# Patient Record
Sex: FEMALE | Race: WHITE | NOT HISPANIC OR LATINO | Employment: OTHER | URBAN - METROPOLITAN AREA
[De-identification: names, ages, dates, MRNs, and addresses within clinical notes are randomized per-mention and may not be internally consistent; named-entity substitution may affect disease eponyms.]

---

## 2017-04-10 ENCOUNTER — TRANSCRIBE ORDERS (OUTPATIENT)
Dept: ADMINISTRATIVE | Facility: HOSPITAL | Age: 72
End: 2017-04-10

## 2017-04-10 DIAGNOSIS — N64.4 PAIN IN BREAST: Primary | ICD-10-CM

## 2017-04-11 ENCOUNTER — TRANSCRIBE ORDERS (OUTPATIENT)
Dept: MAMMOGRAPHY | Facility: HOSPITAL | Age: 72
End: 2017-04-11

## 2017-04-11 DIAGNOSIS — N64.4 BREAST PAIN, LEFT: Primary | ICD-10-CM

## 2017-04-20 ENCOUNTER — APPOINTMENT (OUTPATIENT)
Dept: MAMMOGRAPHY | Facility: HOSPITAL | Age: 72
End: 2017-04-20

## 2017-04-21 ENCOUNTER — APPOINTMENT (OUTPATIENT)
Dept: OTHER | Facility: HOSPITAL | Age: 72
End: 2017-04-21

## 2017-04-21 ENCOUNTER — HOSPITAL ENCOUNTER (OUTPATIENT)
Dept: MAMMOGRAPHY | Facility: HOSPITAL | Age: 72
Discharge: HOME | End: 2017-04-21
Admitting: INTERNAL MEDICINE

## 2017-04-21 DIAGNOSIS — N64.4 BREAST PAIN, LEFT: ICD-10-CM

## 2017-04-21 PROCEDURE — G0279 TOMOSYNTHESIS, MAMMO: HCPCS | Performed by: RADIOLOGY

## 2017-04-21 PROCEDURE — G0279 TOMOSYNTHESIS, MAMMO: HCPCS

## 2017-04-21 PROCEDURE — G0206 DX MAMMO INCL CAD UNI: HCPCS

## 2017-04-21 PROCEDURE — G0206 DX MAMMO INCL CAD UNI: HCPCS | Performed by: RADIOLOGY

## 2020-11-16 ENCOUNTER — OFFICE VISIT (OUTPATIENT)
Dept: ORTHOPEDIC SURGERY | Facility: CLINIC | Age: 75
End: 2020-11-16

## 2020-11-16 VITALS — WEIGHT: 139.99 LBS | HEIGHT: 64 IN | BODY MASS INDEX: 23.9 KG/M2

## 2020-11-16 DIAGNOSIS — S82.892A CLOSED FRACTURE OF LEFT ANKLE, INITIAL ENCOUNTER: Primary | ICD-10-CM

## 2020-11-16 PROCEDURE — 99204 OFFICE O/P NEW MOD 45 MIN: CPT | Performed by: ORTHOPAEDIC SURGERY

## 2020-11-16 PROCEDURE — 27786 TREATMENT OF ANKLE FRACTURE: CPT | Performed by: ORTHOPAEDIC SURGERY

## 2020-11-16 NOTE — PROGRESS NOTES
[facility]    Subjective     Chief Complaint   Patient presents with   • Left Ankle - Pain, Edema        HPI      Christina Guillen is a 75 y.o. female who presents with new problem of: left ankle fracture.  Onset: mechanical fall. The issue has been ongoing for 3 day(s). Pain is a 1/10 on the pain scale. Pain is described as dull. Associated symptoms include pain and swelling. The pain is worse with walking, standing, climbing stairs and any movement of the joint; resting and pain medication and/or NSAID improve the pain. Previous treatments have included: bracing.    I have reviewed the following portions of the patient's history:History of Present Illness and review of systems.    She fell and fractured her ankle on the 13th.  She is in a boot.  Past Medical History:   Diagnosis Date   • Breast cancer (CMS/HCC) 1975    right    • Cancer (CMS/HCC)    • Disease of thyroid gland    • Hypertension       Past Surgical History:   Procedure Laterality Date   • BREAST BIOPSY Right 1975   • MASTECTOMY Right 1975      Family History   Problem Relation Age of Onset   • Heart valve disorder Mother    • Kidney failure Mother    • Breast cancer Neg Hx    • Ovarian cancer Neg Hx      Social History     Socioeconomic History   • Marital status:      Spouse name: Not on file   • Number of children: Not on file   • Years of education: Not on file   • Highest education level: Not on file   Tobacco Use   • Smoking status: Never Smoker   • Smokeless tobacco: Never Used   Substance and Sexual Activity   • Alcohol use: Never     Frequency: Never   • Drug use: Never   • Sexual activity: Defer      Current Outpatient Medications on File Prior to Visit   Medication Sig Dispense Refill   • amLODIPine (NORVASC) 5 MG tablet Take 5 mg by mouth Daily.     • aspirin 81 MG EC tablet Take 81 mg by mouth Daily.     • atenolol (TENORMIN) 25 MG tablet Take 25 mg by mouth Daily.     • levothyroxine (SYNTHROID,  "LEVOTHROID) 50 MCG tablet Take 50 mcg by mouth Daily.     • pravastatin (PRAVACHOL) 20 MG tablet Take 20 mg by mouth Daily.       No current facility-administered medications on file prior to visit.       Allergies   Allergen Reactions   • Levofloxacin Hives   • Erythromycin Rash   • Penicillins Rash   • Septra [Sulfamethoxazole-Trimethoprim] Rash        The following portions of the patient's history were reviewed and updated as appropriate: allergies, current medications, past family history, past medical history, past social history, past surgical history and problem list.    Review of Systems   Constitutional: Negative.    HENT: Negative.    Eyes: Negative.    Respiratory: Negative.    Cardiovascular: Negative.    Gastrointestinal: Negative.    Endocrine: Negative.    Genitourinary: Negative.    Musculoskeletal: Positive for arthralgias and joint swelling.   Skin: Negative.    Allergic/Immunologic: Negative.    Neurological: Negative.    Hematological: Negative.    Psychiatric/Behavioral: Negative.         Objective      Physical Exam  Ht 162.6 cm (64.02\")   Wt 63.5 kg (139 lb 15.9 oz)   BMI 24.02 kg/m²     Body mass index is 24.02 kg/m².    GENERAL APPEARANCE: awake, alert & oriented x 3, in no acute distress and well developed, well nourished  PSYCH: normal mood and affect  LUNGS:  breathing nonlabored, no wheezing  EYES: sclera anicteric, pupils equal  CARDIOVASCULAR: palpable pulses. Capillary refill less than 2 seconds  INTEGUMENTARY: skin intact, no clubbing, cyanosis  NEUROLOGIC:  Normal Sensation and reflexes       Ortho Exam  Peripheral Vascular   Left lower extremity    No cyanotic nail beds    Pink nail beds and rapid capillary refill   Palpation    Pulse - Bilaterally normal    Musculoskeletal   Left ankle   Inspection and Palpation:    Tenderness - exquisitely tender and about the distal fibula    Swelling - hematoma    Effusion - none    Muscle tone - no atrophy    Pulses - " +2   Deformities/Malalignments/Discrepancies    Normal bony contour    There is a documented closed fracture : location - left - distal end of fibula        Imaging/Studies  Imaging Results (Last 7 Days)     ** No results found for the last 168 hours. **        I viewed x-rays from November 14 which show nondisplaced distal fibula fracture  Assessment/Plan        ICD-10-CM ICD-9-CM   1. Closed fracture of left ankle, initial encounter  S82.892A 824.8     The plan will be continue the boot.  Follow-up in 2 weeks to make sure the fracture does not displace.  Weight-bear as tolerated in the boot.  Medical Decision Making  Management Options : over-the-counter medicine and close treatment of fracture or dislocation  Data/Risk: radiology tests and independent visualization of imaging, lab tests, or EMG/NCV    Mike Morales MD  11/16/20  11:23 EST         EMR Dragon/Transcription disclaimer:  Much of this encounter note is an electronic transcription of spoken language to printed text. Electronic transcription of spoken language may permit erroneous, or at times, nonsensical words or phrases to be inadvertently transcribed. Although I have reviewed the note for such errors, some may still exist.

## 2020-11-30 ENCOUNTER — OFFICE VISIT (OUTPATIENT)
Dept: ORTHOPEDIC SURGERY | Facility: CLINIC | Age: 75
End: 2020-11-30

## 2020-11-30 VITALS — BODY MASS INDEX: 23.9 KG/M2 | OXYGEN SATURATION: 98 % | WEIGHT: 139.99 LBS | HEIGHT: 64 IN | HEART RATE: 67 BPM

## 2020-11-30 DIAGNOSIS — S82.892A CLOSED FRACTURE OF LEFT ANKLE, INITIAL ENCOUNTER: Primary | ICD-10-CM

## 2020-11-30 PROCEDURE — 27786 TREATMENT OF ANKLE FRACTURE: CPT | Performed by: ORTHOPAEDIC SURGERY

## 2020-11-30 NOTE — PROGRESS NOTES
NEW PATIENT    Patient: Christina Guillen  : 1945    Primary Care Provider: Annie Valdez MD    Requesting Provider: As above    Pain of the Left Ankle (2 week f/u from Dr. Morales)      History    Chief Complaint: Left ankle injury    History of Present Illness: This is a very pleasant 75-year-old woman who I have seen in the distant past.  She has a nondisplaced transverse left distal fibula fracture, [place] A.  She is here for another opinion.  She fell on 2020, she has been in a short boot.  She was seen at urgent treatment and then saw Dr. Morales.  She has been walking with a walker, mostly because her right knee hurts with the uneven nature of the boot.  She has more pain with activity, better with rest, she rates it as 1 out of 10 right now.    Current Outpatient Medications on File Prior to Visit   Medication Sig Dispense Refill   • amLODIPine (NORVASC) 5 MG tablet Take 5 mg by mouth Daily.     • aspirin 81 MG EC tablet Take 81 mg by mouth Daily.     • atenolol (TENORMIN) 50 MG tablet Take 50 mg by mouth Daily.     • levothyroxine (SYNTHROID, LEVOTHROID) 50 MCG tablet Take 50 mcg by mouth Daily.     • pravastatin (PRAVACHOL) 20 MG tablet Take 20 mg by mouth Daily.       No current facility-administered medications on file prior to visit.       Allergies   Allergen Reactions   • Levofloxacin Hives   • Erythromycin Rash   • Penicillins Rash   • Septra [Sulfamethoxazole-Trimethoprim] Rash      Past Medical History:   Diagnosis Date   • Breast cancer (CMS/HCC)     right    • Cancer (CMS/HCC)    • Disease of thyroid gland    • Hypertension      Past Surgical History:   Procedure Laterality Date   • BREAST BIOPSY Right    • MASTECTOMY Right      Family History   Problem Relation Age of Onset   • Heart valve disorder Mother    • Kidney failure Mother    • Breast cancer Neg Hx    • Ovarian cancer Neg Hx       Social History     Socioeconomic History   • Marital status:  "     Spouse name: Not on file   • Number of children: Not on file   • Years of education: Not on file   • Highest education level: Not on file   Tobacco Use   • Smoking status: Never Smoker   • Smokeless tobacco: Never Used   Substance and Sexual Activity   • Alcohol use: Never     Frequency: Never   • Drug use: Never   • Sexual activity: Defer        Review of Systems   Constitutional: Negative.    HENT: Negative.    Eyes: Negative.    Respiratory: Negative.    Cardiovascular: Negative.    Gastrointestinal: Negative.    Endocrine: Negative.    Genitourinary: Negative.    Musculoskeletal: Positive for arthralgias.   Skin: Negative.    Allergic/Immunologic: Negative.    Neurological: Negative.    Hematological: Negative.    Psychiatric/Behavioral: Negative.        The following portions of the patient's history were reviewed and updated as appropriate: allergies, current medications, past family history, past medical history, past social history, past surgical history and problem list.    Physical Exam:   Pulse 67   Ht 162.6 cm (64.02\")   Wt 63.5 kg (139 lb 15.9 oz)   SpO2 98%   BMI 24.02 kg/m²   GENERAL: Body habitus: normal weight for height    Lower extremity edema: Right: none; Left: trace    Varicose veins:  Right: mild; Left: mild    Gait: using walker     Mental Status:  awake and alert; oriented to person, place, and time    Voice:  clear  SKIN:  Lower extremity: Normal    Hair Growth(lower extremity):  Right:normal; Left:  normal  NAILS: Toenails: normal  HEENT: Head: Normocephalic, atraumatic,  without obvious abnormality.  eye: normal external eye, no icterus  ears:normal external ears  PULM:  Repiratory effort normal  CV:  Dorsalis Pedis:  Right: 2+; Left:2+    Posterior Tibial: Right:2+; Left:2+    Capillary Refill:  Brisk  MSK:  Hand:moderate arthritis      Tibia:  Right:  non tender; Left:  non tender      Ankle:  Right: non tender; Left:  Tender over the distal fibula, nontender over the " syndesmosis, nontender medially, nontender in the heel, moderate swelling around the ankle, no ecchymosis      Foot:  Right:  non tender; Left:  No tenderness in the midfoot and forefoot      NEURO: Heel Walking:  Right:  unable to test; Left:  unable to test    Toe Walking:  Right:  unable to test; Left:  unable to test     [place] 5.07 monofilament test: normal    Lower extremity sensation: intact     Reflexes:  Biceps:  Right:  not tested; Left:  not tested           Quads:  Right:  not tested; Left:  not tested           Ankle:  Right:  not tested; Left:  not tested      Calf Atrophy:none    Motor Function: All motors fire, cannot adequately evaluate strength due to pain on the left, Achilles is intact         Medical Decision Making    Data Review:   ordered and reviewed x-rays today, reviewed radiology images and reviewed radiology results    Assessment and Plan/ Diagnosis/Treatment options:   1. Closed fracture of left ankle, initial encounter  Nondisplaced distal fibular fracture, below the level of the plafond, [place]cel[place] JON.  We talked about the treatment options.  I think a boot is acceptable but it needs to be a tall boot.  The short boot does not adequately immobilize this fracture.  I recommend a tall boot and she was placed in 1 today.  She may walk and do activities as she is comfortable.  I would also recommend an even up for the other side.  I will see her again in 4 to 6 weeks with standing 3 views of the left ankle, to probably allow her to wean out of the boot and start PT  - XR Ankle 3+ View Left            Radiology Ordered []  Radiology Reports Reviewed []      Radiology Images Reviewed []   Labs Reviewed []    Labs Ordered []   PCP Records Reviewed []    Provider Records Reviewed []    ER Records Reviewed []    Hospital Records Reviewed []    History Obtained From Family []    Phone conversation with Provider []    Records Requested []        Andie Hoffman MD

## 2021-01-06 ENCOUNTER — OFFICE VISIT (OUTPATIENT)
Dept: ORTHOPEDIC SURGERY | Facility: CLINIC | Age: 76
End: 2021-01-06

## 2021-01-06 VITALS — BODY MASS INDEX: 23.66 KG/M2 | HEART RATE: 90 BPM | OXYGEN SATURATION: 99 % | WEIGHT: 138.6 LBS | HEIGHT: 64 IN

## 2021-01-06 DIAGNOSIS — M17.11 ARTHRITIS OF KNEE, RIGHT: ICD-10-CM

## 2021-01-06 DIAGNOSIS — S82.892D CLOSED FRACTURE OF LEFT ANKLE WITH ROUTINE HEALING, SUBSEQUENT ENCOUNTER: Primary | ICD-10-CM

## 2021-01-06 PROCEDURE — 99213 OFFICE O/P EST LOW 20 MIN: CPT | Performed by: ORTHOPAEDIC SURGERY

## 2021-01-06 PROCEDURE — 20610 DRAIN/INJ JOINT/BURSA W/O US: CPT | Performed by: PHYSICIAN ASSISTANT

## 2021-01-06 RX ORDER — TRIAMCINOLONE ACETONIDE 40 MG/ML
40 INJECTION, SUSPENSION INTRA-ARTICULAR; INTRAMUSCULAR
Status: COMPLETED | OUTPATIENT
Start: 2021-01-06 | End: 2021-01-06

## 2021-01-06 RX ORDER — LIDOCAINE HYDROCHLORIDE 10 MG/ML
4 INJECTION, SOLUTION EPIDURAL; INFILTRATION; INTRACAUDAL; PERINEURAL
Status: COMPLETED | OUTPATIENT
Start: 2021-01-06 | End: 2021-01-06

## 2021-01-06 RX ADMIN — TRIAMCINOLONE ACETONIDE 40 MG: 40 INJECTION, SUSPENSION INTRA-ARTICULAR; INTRAMUSCULAR at 15:27

## 2021-01-06 RX ADMIN — LIDOCAINE HYDROCHLORIDE 4 ML: 10 INJECTION, SOLUTION EPIDURAL; INFILTRATION; INTRACAUDAL; PERINEURAL at 15:27

## 2021-01-06 NOTE — PROGRESS NOTES
Procedure   Large Joint Arthrocentesis: R knee  Date/Time: 1/6/2021 3:27 PM  Consent given by: patient  Site marked: site marked  Timeout: Immediately prior to procedure a time out was called to verify the correct patient, procedure, equipment, support staff and site/side marked as required   Supporting Documentation  Indications: pain   Procedure Details  Location: knee - R knee  Preparation: Patient was prepped and draped in the usual sterile fashion  Needle size: 22 G  Approach: anterolateral  Medications administered: 4 mL lidocaine PF 1% 1 %; 40 mg triamcinolone acetonide 40 MG/ML  Patient tolerance: patient tolerated the procedure well with no immediate complications

## 2021-01-06 NOTE — PROGRESS NOTES
"ESTABLISHED PATIENT    Patient: Christina Guillen  : 1945    Primary Care Provider: Annie Valdez MD    Requesting Provider: As above    Follow-up (5 weeks follow up for Closed fracture of left ankle)      History    Chief Complaint: Left ankle injury    History of Present Illness: She returns for follow-up of her left distal fibula fracture, she reports the pain is even less.  However her right knee \"is killing me\" it hurts at night.  She has an x-ray in the system from November shows chondrocalcinosis and knee arthritis.    Current Outpatient Medications on File Prior to Visit   Medication Sig Dispense Refill   • amLODIPine (NORVASC) 5 MG tablet Take 5 mg by mouth Daily.     • aspirin 81 MG EC tablet Take 81 mg by mouth Daily.     • atenolol (TENORMIN) 50 MG tablet Take 50 mg by mouth Daily.     • levothyroxine (SYNTHROID, LEVOTHROID) 50 MCG tablet Take 50 mcg by mouth Daily.     • pravastatin (PRAVACHOL) 20 MG tablet Take 20 mg by mouth Daily.       No current facility-administered medications on file prior to visit.       Allergies   Allergen Reactions   • Levofloxacin Hives   • Erythromycin Rash   • Penicillins Rash   • Septra [Sulfamethoxazole-Trimethoprim] Rash      Past Medical History:   Diagnosis Date   • Breast cancer (CMS/HCC)     right    • Cancer (CMS/HCC)    • Disease of thyroid gland    • Hypertension      Past Surgical History:   Procedure Laterality Date   • BREAST BIOPSY Right    • MASTECTOMY Right      Family History   Problem Relation Age of Onset   • Heart valve disorder Mother    • Kidney failure Mother    • Breast cancer Neg Hx    • Ovarian cancer Neg Hx       Social History     Socioeconomic History   • Marital status:      Spouse name: Not on file   • Number of children: Not on file   • Years of education: Not on file   • Highest education level: Not on file   Tobacco Use   • Smoking status: Never Smoker   • Smokeless tobacco: Never Used   Substance and Sexual " "Activity   • Alcohol use: Never     Frequency: Never   • Drug use: Never   • Sexual activity: Defer        Review of Systems   Constitutional: Negative.    HENT: Negative.    Eyes: Negative.    Respiratory: Negative.    Cardiovascular: Negative.    Gastrointestinal: Negative.    Endocrine: Negative.    Genitourinary: Negative.    Musculoskeletal: Positive for arthralgias.   Skin: Negative.    Allergic/Immunologic: Negative.    Neurological: Negative.    Hematological: Negative.    Psychiatric/Behavioral: Negative.        The following portions of the patient's history were reviewed and updated as appropriate: allergies, current medications, past family history, past medical history, past social history, past surgical history and problem list.    Physical Exam:   Pulse 90   Ht 162.6 cm (64.02\")   Wt 62.9 kg (138 lb 9.6 oz)   SpO2 99%   BMI 23.78 kg/m²   GENERAL: Body habitus: normal weight for height    Lower extremity edema: Left: trace; Right: trace    Gait: antalgic on the right     Mental Status:  awake and alert; oriented to person, place, and time  MSK:  Tibia:  Right:  Right knee is tender to palpation along medial joint line; Left:  non tender        Ankle:  Right: non tender; Left:  No tenderness over the fracture, range of motion almost full        Foot:  Right:  non tender; Left:  non tender    NEURO Sensation:  intact    Medical Decision Making    Data Review:   ordered and reviewed x-rays today    Assessment/Plan/Diagnosis/Treatment Options:   1. Closed fracture of left ankle with routine healing, subsequent encounter  The distal fibula fracture is healing well.  She may wean out of the boot and start PT.  I think PT will help her regain balance and proprioception.  -I will see her again in 8 to 12 weeks with 2 views of the ankle    2. Arthritis of knee, right  I think the arthritis has been aggravated by the abnormal gait walking in the boot, I think an injection is reasonable, she agrees.  Ms. " Davon did the injection.    Using sterile technique, the right knee was sterily prepped with Hibiclens.  Following a time out, the right knee was injected with 40 mg Kenalog and 4cc of lidocaine.  Patient tolerated the procedure well.  No complications.     Andie Hoffman MD

## 2021-01-19 ENCOUNTER — HOSPITAL ENCOUNTER (OUTPATIENT)
Dept: PHYSICAL THERAPY | Facility: HOSPITAL | Age: 76
Setting detail: THERAPIES SERIES
Discharge: HOME | End: 2021-01-19

## 2021-01-19 DIAGNOSIS — S82.892D CLOSED FRACTURE OF LEFT ANKLE WITH ROUTINE HEALING, SUBSEQUENT ENCOUNTER: Primary | ICD-10-CM

## 2021-01-19 PROCEDURE — 97161 PT EVAL LOW COMPLEX 20 MIN: CPT

## 2021-01-19 NOTE — THERAPY EVALUATION
Outpatient Physical Therapy Ortho Initial Evaluation  [facility]     Patient Name: Christina Guillen  : 1945  MRN: [id number]  Today's Date: 2021      Visit Date: 2021    There is no problem list on file for this patient.       Past Medical History:   Diagnosis Date   • Breast cancer (CMS/HCC)     right    • Cancer (CMS/HCC)    • Disease of thyroid gland    • Hypertension         Past Surgical History:   Procedure Laterality Date   • BREAST BIOPSY Right    • MASTECTOMY Right        Visit Dx:     ICD-10-CM ICD-9-CM   1. Closed fracture of left ankle with routine healing, subsequent encounter  S82.892D V54.19         Patient History     Row Name 21 0945             History    Chief Complaint  Difficulty Walking;Balance Problems  -LF      Date Current Problem(s) Began  20  -LF      Brief Description of Current Complaint  Patient presents s/p left distal fibula fracture.  Injury occured when she missed a step and slipped going downstairs.  She was in a walking boot for 7 weeks, and has now transitioned to regular shoes 2 weeks ago.  Was having right knee pain aggravated by being in the boot.  She had a cortisone injection recently which helped some.  Knee pain comes and goes.  Has never had much ankle pain.  -LF      Occupation/sports/leisure activities  some walking  -LF         Pain     Pain Location  Ankle  -LF      Pain at Present  0  -LF         Fall Risk Assessment    Any falls in the past year:  Yes  -LF      Number of falls reported in the last 12 months  1  -LF         Daily Activities    Primary Language  English  -LF      Are you able to read  Yes  -LF      Are you able to write  Yes  -LF      Pt Participated in POC and Goals  Yes  -LF        User Key  (r) = Recorded By, (t) = Taken By, (c) = Cosigned By    Initials Name Provider Type    LF Eli Youssef PT Physical Therapist          PT Ortho     Row Name 21 1000       Posture/Observations     "Posture/Observations Comments  Patient ambulates independently w/o A.D. in athletic shoes.   Decreased WB onto RLE.  No signifcant edema at ankle  -LF       General ROM    RT Lower Ext  Rt Ankle Dorsiflexion;Rt Ankle Plantarflexion;Rt Ankle Inversion;Rt Ankle Eversion  -LF    LT Lower Ext  Lt Ankle Dorsiflexion;Lt Ankle Plantarflexion;Lt Ankle Inversion;Lt Ankle Eversion  -LF       Right Lower Ext    Rt Ankle Dorsiflexion AROM  12  -LF    Rt Ankle Plantarflexion AROM  40  -LF    Rt Ankle Inversion AROM  35  -LF    Rt Ankle Eversion AROM  18  -LF       Left Lower Ext    Lt Ankle Dorsiflexion AROM  10  -LF    Lt Ankle Inversion AROM  35  -LF    Lt Ankle Eversion AROM  8  -LF       MMT (Manual Muscle Testing)    General MMT Comments  Able to complete DL heel raise x10.  LE strength 4+/5 HS curl and left foot eversion, otherwise 5/5  -LF       Sensation    Sensation WNL?  WNL  -LF       Balance Skills Training    SLS  R: 4s, L: 1s  -LF    Rhomberg  WNL  -LF    Sharpened Rhomberg  R:  8s, L 7s  -LF    Balance Comments  balance WNL's eyes closed, toe taps onto 6\" step, static look behind.   Slow with 360 turn  -LF      User Key  (r) = Recorded By, (t) = Taken By, (c) = Cosigned By    Initials Name Provider Type    LF Eli Youssef, PT Physical Therapist        Therapy Education  Education Details: HEP issued for supine and sidelying SLR, bridge, ankle eversion with red band, standing 3-way kicks with or without red band, SLS, tandem stance, standing heel and toe raises  Given: HEP  Program: New  How Provided: Verbal, Demonstration, Written  Provided to: Patient  Level of Understanding: Teach back education performed, Verbalized, Demonstrated     PT OP Goals     Row Name 01/19/21 1000          PT Short Term Goals    STG Date to Achieve  01/26/21  -LF     STG 1  Patient independent with HEP for improved strength, ROM, and balance  -LF     STG 1 Progress  New  -LF        Long Term Goals    LTG Date to Achieve  02/16/21  " -LF     LTG 1  Patient able to maintain SLS at least 8s  -LF     LTG 1 Progress  New  -LF     LTG 2  Patient able to maintain sharpened Rhomberg stance at least 20s to demonstrate improved balance.  -LF     LTG 2 Progress  New  -LF     LTG 3  Patient to report ability to walk 1mile without difficulty  -LF     LTG 3 Progress  New  -LF        Time Calculation    PT Goal Re-Cert Due Date  01/28/21  -LF       User Key  (r) = Recorded By, (t) = Taken By, (c) = Cosigned By    Initials Name Provider Type    LF Eli Youssef, PT Physical Therapist          PT Assessment/Plan     Row Name 01/19/21 1000          PT Assessment    Functional Limitations  Impaired gait  -LF     Impairments  Balance;Gait;Muscle strength;Range of motion  -LF     Assessment Comments  Patient presents s/p left ankle fracture.  She has weaned herself from her boot and has been walking in regular shoes for ~2 weeks.   She has mild strength and ROM deficits (eversion), along with impaired balance.  Will benefit from continued treatment to maximize functional mobility.  Will focus on high-level balance activites next visit, and progress HEP.   -LF     Please refer to paper survey for additional self-reported information  Yes  -LF     Rehab Potential  Good  -LF     Patient/caregiver participated in establishment of treatment plan and goals  Yes  -LF     Patient would benefit from skilled therapy intervention  Yes  -LF        PT Plan    PT Frequency  1x/week  -LF     Predicted Duration of Therapy Intervention (PT)  2-3 visits  -LF     Planned CPT's?  PT EVAL LOW COMPLEXITY: 74347;PT THER PROC EA 15 MIN: 62541;PT THER ACT EA 15 MIN: 21833;PT MANUAL THERAPY EA 15 MIN: 32336;PT NEUROMUSC RE-EDUCATION EA 15 MIN: 94427;PT GAIT TRAINING EA 15 MIN: 13990;PT SELF CARE/HOME MGMT/TRAIN EA 15: 72675  -LF     PT Plan Comments  follow-up next week.  Incorporate balance and proprioception exercises next visit  -LF       User Key  (r) = Recorded By, (t) = Taken By,  (c) = Cosigned By    Initials Name Provider Type     Eli Youssef PT Physical Therapist                 Outcome Measure Options: Lower Extremity Functional Scale (LEFS)  Lower Extremity Functional Index  Any of your usual work, housework or school activities: No difficulty  Your usual hobbies, recreational or sporting activities: No difficulty  Getting into or out of the bath: No difficulty  Walking between rooms: No difficulty  Putting on your shoes or socks: No difficulty  Squatting: No difficulty  Lifting an object, like a bag of groceries from the floor: No difficulty  Performing light activities around your home: No difficulty  Performing heavy activities around your home: No difficulty  Getting into or out of a car: No difficulty  Walking 2 blocks: No difficulty  Walking a mile: Quite a bit of difficulty  Going up or down 10 stairs (about 1 flight of stairs): No difficulty  Standing for 1 hour: No difficulty  Sitting for 1 hour: No difficulty  Running on even ground: A little bit of difficulty  Running on uneven ground: A little bit of difficulty  Making sharp turns while running fast: A little bit of difficulty  Hopping: A little bit of difficulty  Rolling over in bed: No difficulty  Total: 73      Time Calculation:     Start Time: 0945     Therapy Charges for Today     Code Description Service Date Service Provider Modifiers Qty    [id number] HC PT EVAL LOW COMPLEXITY 4 1/19/2021 Eli Youssef, PT GP 1          PT G-Codes  Outcome Measure Options: Lower Extremity Functional Scale (LEFS)  Total: 73         Eli Youssef PT  1/19/2021

## 2021-01-25 ENCOUNTER — HOSPITAL ENCOUNTER (OUTPATIENT)
Dept: PHYSICAL THERAPY | Facility: HOSPITAL | Age: 76
Setting detail: THERAPIES SERIES
Discharge: HOME | End: 2021-01-25

## 2021-01-25 DIAGNOSIS — S82.892D CLOSED FRACTURE OF LEFT ANKLE WITH ROUTINE HEALING, SUBSEQUENT ENCOUNTER: Primary | ICD-10-CM

## 2021-01-25 PROCEDURE — 97110 THERAPEUTIC EXERCISES: CPT

## 2021-01-25 NOTE — THERAPY TREATMENT NOTE
Outpatient Physical Therapy Ortho Treatment Note  [facility]     Patient Name: Christina Guillen  : 1945  MRN: [id number]  Today's Date: 2021      Visit Date: 2021    Visit Dx:    ICD-10-CM ICD-9-CM   1. Closed fracture of left ankle with routine healing, subsequent encounter  S82.892D V54.19       There is no problem list on file for this patient.       Past Medical History:   Diagnosis Date   • Breast cancer (CMS/HCC)     right    • Cancer (CMS/HCC)    • Disease of thyroid gland    • Hypertension         Past Surgical History:   Procedure Laterality Date   • BREAST BIOPSY Right    • MASTECTOMY Right            PT Assessment/Plan     Row Name 21          PT Assessment    Assessment Comments  Patient did well with balance and strength exercises completed today.  Needs intermittent support for balance activities.  Back pain seems associated with standing hip exericises.  Advised she try without band, and to also incororating abdominal ms. bracing for lumb support  -LF        PT Plan    PT Plan Comments  follow-up next week.  Will likely be able to d/c  -LF       User Key  (r) = Recorded By, (t) = Taken By, (c) = Cosigned By    Initials Name Provider Type    LF Eli Youssef, PT Physical Therapist            OP Exercises     Row Name 21 2190             Subjective Comments    Subjective Comments  Had some back pain with some of the exercises, but doesn't recall which  -LF         Subjective Pain    Able to rate subjective pain?  yes  -LF      Pre-Treatment Pain Level  0  -LF      Post-Treatment Pain Level  0  -LF         Total Minutes    25974 - PT Therapeutic Exercise Minutes  40  -LF         Exercise 1    Exercise Name 1  NuStep L5  -LF      Time 1  5'  -LF         Exercise 2    Exercise Name 2  BAPS board L2  -LF      Reps 2  10 each  -LF         Exercise 3    Exercise Name 3  seated HS stretch, standing gastroc-soleus stretches on / foam roll  -LF      Time 3   3'  -LF         Exercise 4    Exercise Name 4  4-way ankle red band  -LF      Reps 4  10 each  -LF         Exercise 5    Exercise Name 5  seated ankle eversion with red band at feet  -LF      Reps 5  15  -LF         Exercise 6    Exercise Name 6  standing 3-way kicks red band  -LF      Sets 6  10 each  -LF         Exercise 7    Exercise Name 7  tandem stance  -LF      Time 7  30s each  -LF         Exercise 8    Exercise Name 8  standing on blue foam:  EO, EC, head turns, head nods, arms swings  -LF      Reps 8  30s/30s/10/10/10  -LF         Exercise 9    Exercise Name 9  heel toe walking, backward walk  -LF      Reps 9  2 laps each  -LF         Exercise 10    Exercise Name 10  standing heel and toe raises  -LF      Reps 10  10 each  -LF        User Key  (r) = Recorded By, (t) = Taken By, (c) = Cosigned By    Initials Name Provider Type    LF Eli Youssef, PT Physical Therapist          Therapy Education  Education Details: Updated HEP with gastroc and HS stretches  Given: HEP  Program: Reinforced, Progressed  How Provided: Verbal, Demonstration, Written  Provided to: Patient  Level of Understanding: Teach back education performed, Demonstrated, Verbalized    Time Calculation:   Start Time: 0930  Therapy Charges for Today     Code Description Service Date Service Provider Modifiers Qty    43816835953 HC PT THER PROC EA 15 MIN 1/25/2021 Eli Youssef, PT GP 3                    Eli Youssef, PT  1/25/2021

## 2021-02-01 ENCOUNTER — HOSPITAL ENCOUNTER (OUTPATIENT)
Dept: PHYSICAL THERAPY | Facility: HOSPITAL | Age: 76
Setting detail: THERAPIES SERIES
Discharge: HOME | End: 2021-02-01

## 2021-02-01 DIAGNOSIS — S82.892D CLOSED FRACTURE OF LEFT ANKLE WITH ROUTINE HEALING, SUBSEQUENT ENCOUNTER: Primary | ICD-10-CM

## 2021-02-01 PROCEDURE — 97110 THERAPEUTIC EXERCISES: CPT

## 2021-02-01 NOTE — THERAPY DISCHARGE NOTE
Outpatient Physical Therapy Ortho Treatment Note/Discharge Summary   [place]     Patient Name: Christina Guillen  : 1945  MRN: [id number]  Today's Date: 2021      Visit Date: 2021    Visit Dx:    ICD-10-CM ICD-9-CM   1. Closed fracture of left ankle with routine healing, subsequent encounter  S82.892D V54.19       There is no problem list on file for this patient.       Past Medical History:   Diagnosis Date   • Breast cancer (CMS/HCC)     right    • Cancer (CMS/HCC)    • Disease of thyroid gland    • Hypertension         Past Surgical History:   Procedure Laterality Date   • BREAST BIOPSY Right    • MASTECTOMY Right        PT Ortho     Row Name 21 1100       Left Lower Ext    Lt Ankle Dorsiflexion AROM  12  -LF    Lt Ankle Plantarflexion AROM  35  -LF    Lt Ankle Inversion AROM  35  -LF    Lt Ankle Eversion AROM  15  -LF       Balance Skills Training    SLS  R:  12s, L 10s  -LF    Sharpened Rhomberg  bilat:  30s  -LF      User Key  (r) = Recorded By, (t) = Taken By, (c) = Cosigned By    Initials Name Provider Type    LF Eli Youssef, PT Physical Therapist              PT Assessment/Plan     Row Name 21 1045          PT Assessment    Assessment Comments  Patient has met or partially met all goals.  She is not having any ankle pain, and no concerns with strength, balance, or ROM.  HEP updated and reinforced.  No further PT needed at this time.  -LF        PT Plan    PT Plan Comments  d/c  -LF       User Key  (r) = Recorded By, (t) = Taken By, (c) = Cosigned By    Initials Name Provider Type     Eli Youssef, PT Physical Therapist              OP Exercises     Row Name 21 1045 21 1000          Subjective Comments    Subjective Comments  Reports compliance with HEP.  Has not tried long walk  -LF  --        Subjective Pain    Able to rate subjective pain?  yes  -LF  --     Pre-Treatment Pain Level  0  -LF  --     Post-Treatment Pain Level  0  -LF  --         Total Minutes    45848 - PT Therapeutic Exercise Minutes  50  -LF  --        Exercise 1    Exercise Name 1  NuStep L5  -LF  --  -LF     Time 1  5'  -LF  --  -LF        Exercise 2    Exercise Name 2  BAPS board L3  -LF  --     Reps 2  10 each  -LF  --        Exercise 3    Exercise Name 3  seated HS stretch, standing gastroc-soleus stretches on 1/2 foam roll  -LF  --     Time 3  3'  -LF  --        Exercise 4    Exercise Name 4  IT band stretch, piriformis stretch (supine and seated)  -LF  --     Time 4  3'  -LF  --        Exercise 5    Exercise Name 5  seated ankle inversion and PF with red band  -LF  --     Reps 5  15  -LF  --        Exercise 6    Exercise Name 6  heel-toe, backward walk, march with pause  -LF  --     Reps 6  2 laps each  -LF  --        Exercise 7    Exercise Name 7  tandem stance on 1/2 foam roll (both surfaces  -LF  --     Time 7  30s each  -LF  --        Exercise 8    Exercise Name 8  heel/toe raises on 1/2 foam roll  -LF  --     Reps 8  10 each  -LF  --       User Key  (r) = Recorded By, (t) = Taken By, (c) = Cosigned By    Initials Name Provider Type    LF Eli Youssef, PT Physical Therapist              PT OP Goals     Row Name 02/01/21 1045          PT Short Term Goals    STG Date to Achieve  01/26/21  -LF     STG 1  Patient independent with HEP for improved strength, ROM, and balance  -LF     STG 1 Progress  Met  -LF        Long Term Goals    LTG Date to Achieve  02/16/21  -LF     LTG 1  Patient able to maintain SLS at least 8s  -LF     LTG 1 Progress  Met  -LF     LTG 2  Patient able to maintain sharpened Rhomberg stance at least 20s to demonstrate improved balance.  -LF     LTG 2 Progress  Met  -LF     LTG 3  Patient to report ability to walk 1mile without difficulty  -LF     LTG 3 Progress  Partially Met  -LF     LTG 3 Progress Comments  has not done any distance walking, but okay at home, store, etc.  -LF       User Key  (r) = Recorded By, (t) = Taken By, (c) = Cosigned By     Initials Name Provider Type     Eli Youssef, PT Physical Therapist          Therapy Education  Education Details: Updated HEP with IT band, piriformis, and HS stretches (variations for sitting and supine), ankle PF and inversion with theraband (issued red and green),  Given: HEP  Program: Progressed, Reinforced  How Provided: Verbal, Demonstration, Written  Provided to: Patient  Level of Understanding: Teach back education performed, Verbalized, Demonstrated       Time Calculation:   Start Time: 1045  Therapy Charges for Today     Code Description Service Date Service Provider Modifiers Qty    40566377859 HC PT THER PROC EA 15 MIN 2/1/2021 Eli Youssef, PT GP 3            OP PT Discharge Summary  Date of Discharge: 02/01/21  Reason for Discharge: All goals achieved, Maximum functional potential achieved  Outcomes Achieved: Able to achieve all goals within established timeline, Patient able to partially acheive established goals  Discharge Destination: Home with home program      Eli Youssef, PT  2/1/2021

## 2021-03-29 ENCOUNTER — OFFICE VISIT (OUTPATIENT)
Dept: ORTHOPEDIC SURGERY | Facility: CLINIC | Age: 76
End: 2021-03-29

## 2021-03-29 VITALS
HEIGHT: 64 IN | WEIGHT: 150.4 LBS | BODY MASS INDEX: 25.68 KG/M2 | SYSTOLIC BLOOD PRESSURE: 138 MMHG | HEART RATE: 54 BPM | DIASTOLIC BLOOD PRESSURE: 70 MMHG

## 2021-03-29 DIAGNOSIS — M17.11 ARTHRITIS OF KNEE, RIGHT: ICD-10-CM

## 2021-03-29 DIAGNOSIS — S82.892D CLOSED FRACTURE OF LEFT ANKLE WITH ROUTINE HEALING, SUBSEQUENT ENCOUNTER: Primary | ICD-10-CM

## 2021-03-29 PROCEDURE — 99213 OFFICE O/P EST LOW 20 MIN: CPT | Performed by: ORTHOPAEDIC SURGERY

## 2021-03-29 NOTE — PROGRESS NOTES
ESTABLISHED PATIENT    Patient: Christina Guillen  : 1945    Primary Care Provider: Annie Valdez MD    Requesting Provider: As above    Follow-up (11 weeks follow up for Closed fracture of left ankle with routine healing)      History    Chief Complaint: Left ankle fracture, right knee pain    History of Present Illness: She returns for follow-up of her left distal fibula fracture and right knee pain.  She reports the knee is much better, the ankle no longer hurts.    Current Outpatient Medications on File Prior to Visit   Medication Sig Dispense Refill   • amLODIPine (NORVASC) 5 MG tablet Take 5 mg by mouth Daily.     • aspirin 81 MG EC tablet Take 81 mg by mouth Daily.     • atenolol (TENORMIN) 50 MG tablet Take 50 mg by mouth Daily.     • levothyroxine (SYNTHROID, LEVOTHROID) 50 MCG tablet Take 50 mcg by mouth Daily.     • pravastatin (PRAVACHOL) 20 MG tablet Take 20 mg by mouth Daily.       No current facility-administered medications on file prior to visit.      Allergies   Allergen Reactions   • Levofloxacin Hives   • Erythromycin Rash   • Penicillins Rash   • Septra [Sulfamethoxazole-Trimethoprim] Rash      Past Medical History:   Diagnosis Date   • Breast cancer (CMS/HCC)     right    • Cancer (CMS/HCC)    • Disease of thyroid gland    • Hypertension      Past Surgical History:   Procedure Laterality Date   • BREAST BIOPSY Right    • MASTECTOMY Right      Family History   Problem Relation Age of Onset   • Heart valve disorder Mother    • Kidney failure Mother    • Breast cancer Neg Hx    • Ovarian cancer Neg Hx       Social History     Socioeconomic History   • Marital status:      Spouse name: Not on file   • Number of children: Not on file   • Years of education: Not on file   • Highest education level: Not on file   Tobacco Use   • Smoking status: Never Smoker   • Smokeless tobacco: Never Used   Substance and Sexual Activity   • Alcohol use: Never   • Drug use: Never   •  "Sexual activity: Defer        Review of Systems   Constitutional: Negative.    HENT: Negative.    Eyes: Negative.    Respiratory: Negative.    Cardiovascular: Negative.    Gastrointestinal: Negative.    Endocrine: Negative.    Genitourinary: Negative.    Musculoskeletal: Positive for arthralgias.   Skin: Negative.    Allergic/Immunologic: Negative.    Neurological: Negative.    Hematological: Negative.    Psychiatric/Behavioral: Negative.        The following portions of the patient's history were reviewed and updated as appropriate: allergies, current medications, past family history, past medical history, past social history, past surgical history and problem list.    Physical Exam:   /70   Pulse 54   Ht 162.6 cm (64.02\")   Wt 68.2 kg (150 lb 6.4 oz)   BMI 25.80 kg/m²   GENERAL: Body habitus: normal weight for height    Lower extremity edema: Left: none; Right: none    Gait: normal     Mental Status:  awake and alert; oriented to person, place, and time  MSK:  Tibia:  Right:  non tender; Left:  non tender        Ankle:  Right: non tender; Left:  non tender, ROM  normal and symmetric and motor function  normal        Foot:  Right:  non tender; Left:  non tender    NEURO Sensation:  intact    Medical Decision Making    Data Review:   ordered and reviewed x-rays today    Assessment/Plan/Diagnosis/Treatment Options:   1. Closed fracture of left ankle with routine healing, subsequent encounter  No further pain in the ankle.  She has excellent range of motion.  I will be happy to see her anytime  - XR Ankle 2 View Left    2. Arthritis of knee, right  Right knee is much improved after injection.  If the pain recurs she will return to see one of the partners        Andie Hoffman MD                      "

## 2021-07-31 PROCEDURE — U0004 COV-19 TEST NON-CDC HGH THRU: HCPCS | Performed by: NURSE PRACTITIONER

## 2021-08-02 ENCOUNTER — TELEPHONE (OUTPATIENT)
Dept: URGENT CARE | Facility: CLINIC | Age: 76
End: 2021-08-02

## 2021-08-20 ENCOUNTER — OFFICE VISIT (OUTPATIENT)
Dept: ORTHOPEDIC SURGERY | Facility: CLINIC | Age: 76
End: 2021-08-20

## 2021-08-20 VITALS
WEIGHT: 147.05 LBS | BODY MASS INDEX: 25.1 KG/M2 | DIASTOLIC BLOOD PRESSURE: 75 MMHG | HEIGHT: 64 IN | SYSTOLIC BLOOD PRESSURE: 141 MMHG | HEART RATE: 79 BPM

## 2021-08-20 DIAGNOSIS — S62.112A CLOSED CHIP FRACTURE OF TRIQUETRUM OF LEFT WRIST, INITIAL ENCOUNTER: ICD-10-CM

## 2021-08-20 DIAGNOSIS — M25.532 LEFT WRIST PAIN: Primary | ICD-10-CM

## 2021-08-20 PROCEDURE — 99214 OFFICE O/P EST MOD 30 MIN: CPT | Performed by: PHYSICIAN ASSISTANT

## 2021-08-20 NOTE — PROGRESS NOTES
[facility] [place]    Subjective     Chief Complaint   Patient presents with   • Left Wrist - Pain   DOI: 7/30/2021    HPI  Christina Guillen is a 76 y.o. female.  Left-hand-dominant.  Established patient presents for evaluation of left wrist and thumb pain.  KAJAL: Patient was walking her dog and got her hand caught in the leash causing her to fall resulting in injury to the left wrist. She was seen in urgent care on 7/31/2021 diagnosed with a triquetral fracture and placed in a thumb spica splint. Patient reports that the pain to the dorsum of the wrist is improved but she has had some increased pain to the thumb as well as stiffness. No reported numbness or tingling into the hand or digits.    Pain scale: 2/10.  Severity of the pain mild.  Quality of the pain dull.  Associated symptoms pain only.  Activity related to pain gripping, grasping. Thumb spica brace provided by urgent care and Tylenol.    Denies fever, chills, night sweats or other constitutional symptoms. Patient also under the care of Dr. Hoffman.      Past Medical History:   Diagnosis Date   • Breast cancer (CMS/HCC) 1975    right    • Cancer (CMS/HCC)    • Disease of thyroid gland    • Hypertension       Past Surgical History:   Procedure Laterality Date   • BREAST BIOPSY Right 1975   • MASTECTOMY Right 1975      Family History   Problem Relation Age of Onset   • Heart valve disorder Mother    • Kidney failure Mother    • Breast cancer Neg Hx    • Ovarian cancer Neg Hx      Social History     Socioeconomic History   • Marital status:      Spouse name: Not on file   • Number of children: Not on file   • Years of education: Not on file   • Highest education level: Not on file   Tobacco Use   • Smoking status: Never Smoker   • Smokeless tobacco: Never Used   Substance and Sexual Activity   • Alcohol use: Never   • Drug use: Never   • Sexual activity: Defer      Current Outpatient Medications on File Prior to Visit   Medication  "Sig Dispense Refill   • amLODIPine (NORVASC) 5 MG tablet Take 5 mg by mouth Daily.     • aspirin 81 MG EC tablet Take 81 mg by mouth Daily.     • atenolol (TENORMIN) 50 MG tablet Take 50 mg by mouth Daily.     • levothyroxine (SYNTHROID, LEVOTHROID) 50 MCG tablet Take 50 mcg by mouth Daily.     • pravastatin (PRAVACHOL) 20 MG tablet Take 20 mg by mouth Daily.     • triamcinolone (KENALOG) 0.1 % cream triamcinolone acetonide 0.1 % topical cream   APPLY A THIN LAYER TO THE AFFECTED AREA(S) BY TOPICAL ROUTE 1 TIME PER DAY FOR UP TO 14 DAYS, THEN OFF AT LEAST 7 DAYS       No current facility-administered medications on file prior to visit.      Allergies   Allergen Reactions   • Levofloxacin Hives   • Erythromycin Rash   • Macrobid [Nitrofurantoin] Rash   • Penicillins Rash   • Septra [Sulfamethoxazole-Trimethoprim] Rash        The following portions of the patient's history were reviewed and updated as appropriate: allergies, current medications, past family history, past medical history, past social history, past surgical history and problem list.    Review of Systems   Constitutional: Negative.    HENT: Positive for congestion.    Eyes: Negative.    Respiratory: Positive for cough.    Cardiovascular: Negative.    Gastrointestinal: Negative.    Endocrine: Negative.    Genitourinary: Negative.    Musculoskeletal: Positive for arthralgias.   Skin: Positive for rash.   Allergic/Immunologic: Negative.    Neurological: Negative.    Hematological: Negative.    Psychiatric/Behavioral: Negative.         Objective      Physical Exam  /75   Pulse 79   Ht 162.6 cm (64.02\")   Wt 66.7 kg (147 lb 0.8 oz)   BMI 25.23 kg/m²     Body mass index is 25.23 kg/m².    GENERAL APPEARANCE: awake, alert & oriented x 3, in no acute distress and well developed, well nourished  PSYCH: normal mood and affect  LUNGS:  breathing nonlabored, no wheezing  EYES: sclera anicteric, pupils equal  CARDIOVASCULAR: palpable pulses. Capillary " refill less than 2 seconds  INTEGUMENTARY: skin intact, no clubbing, cyanosis  NEUROLOGIC:  Normal Sensation         Ortho Exam  Left wrist  Skin: Grossly intact with any redness, warmth, swelling.  Negative deformity to forearm/wrist  Tenderness: Slight discomfort noted dorsum of the wrist over triquetrum. Patient also has some mild discomfort noted to the base of the thumb.  Motion: near FROM wrist and thumb.  Motor: Grossly intact R/U/M/AIN/PIN  Sensory: Grossly intact LT: R/U/M  Vascular: Brisk capillary refill, 2+ radial pulse    Shoulder and elbow  Skin intact  No tenderness  Full range of motion      Imaging/Studies  Ordered left wrist plain films.  Imaging read/interpreted by Dr. Morales.    Imaging Results (Last 7 Days)     Procedure Component Value Units Date/Time    XR Wrist 3+ View Left [[id number]] Resulted: 08/20/21 0942     Updated: 08/20/21 0943    Narrative:      Left Wrist X-Ray  Indication: Pain  AP, Lateral, and Oblique views    Findings:  Healing triquetral avulsion fracture  No bony lesion  Normal soft tissues  Normal joint spaces    prior studies were available for comparison.            Assessment/Plan        ICD-10-CM ICD-9-CM   1. Left wrist pain  M25.532 719.43   2. Avulsion fracture of left wrist  S62.102A 814.00       Orders Placed This Encounter   Procedures   • XR Wrist 3+ View Left        -Left wrist pain due to triquetral avulsion fracture, stable and healing.  -Patient was changed out from a thumb spica splint to a cock-up wrist splint.  -Encourage removal with gentle range of motion.  -No strenuous gripping or grasping activities for now.  -May continue use of Tylenol as needed.  -Follow-up in 3 weeks for repeat evaluation which will include preclinic imaging. If her thumb is still painful then will perform repeat evaluation with treatment based on the exam.  -Questions and concerns answered.    Medical Decision Making  Management Options : over-the-counter medicine and close  treatment of fracture or dislocation  Data/Risk: radiology tests       Bailee Quinones PA-C  08/24/21  11:58 EDT               EMR Dragon/Transcription disclaimer:  Much of this encounter note is an electronic transcription of spoken language to printed text. Electronic transcription of spoken language may permit erroneous, or at times, nonsensical words or phrases to be inadvertently transcribed. Although I have reviewed the note for such errors, some may still exist.

## 2021-09-13 ENCOUNTER — OFFICE VISIT (OUTPATIENT)
Dept: ORTHOPEDIC SURGERY | Facility: CLINIC | Age: 76
End: 2021-09-13

## 2021-09-13 VITALS
BODY MASS INDEX: 25.1 KG/M2 | DIASTOLIC BLOOD PRESSURE: 65 MMHG | WEIGHT: 147.05 LBS | HEART RATE: 60 BPM | SYSTOLIC BLOOD PRESSURE: 134 MMHG | HEIGHT: 64 IN

## 2021-09-13 DIAGNOSIS — S62.115D CLOSED NONDISPLACED FRACTURE OF TRIQUETRUM OF LEFT WRIST WITH ROUTINE HEALING, SUBSEQUENT ENCOUNTER: Primary | ICD-10-CM

## 2021-09-13 PROCEDURE — 99213 OFFICE O/P EST LOW 20 MIN: CPT | Performed by: PHYSICIAN ASSISTANT

## 2021-09-13 NOTE — PROGRESS NOTES
"    [facility] [facility] [place]        Subjective     Follow-up (3 week follow up - Left wrist pain  DOI: 7/30/2021)       HPI    Christina Guillen is a 76 y.o. female.  Patient returns for follow-up triquetral avulsion fracture.  She is doing much better.  She has the cock-up wrist splint which feels good.  She has been working on range of motion.  No reported numbness or tingling.  Pain scale maximum 1/10.    Since being placed into the cock-up wrist splint instead of the thumb spica splint that she was given at the urgent care her thumb pain has improved.    Patient denies any fever, chills, night sweats or other constitutional symptoms.        Objective      Physical Exam  /65   Pulse 60   Ht 162.6 cm (64.02\")   Wt 66.7 kg (147 lb 0.8 oz)   BMI 25.23 kg/m²     Body mass index is 25.23 kg/m².        Ortho Exam  Left wrist  Skin: Grossly intact with any redness, warmth, swelling.    Tenderness: Very slight discomfort dorsum of the wrist on today's exam  Motion: Full range of motion of the wrist with noted stiffness.  Motor: Grossly intact R/U/M/AIN/PIN  Sensory: Grossly intact LT: R/U/M  Vascular: Brisk capillary refill, 2+ radial pulse      Imaging/Studies  Ordered left wrist plain films.  Images read/interpreted by Dr. Morales.    Left Wrist X-Ray  Indication: Pain  AP, Lateral, and Oblique views     Findings:  Healing of left wrist triquetral avulsion fracture  No bony lesion  Normal soft tissues  Normal joint spaces      prior studies were available for comparison.      Assessment:  1. Closed nondisplaced fracture of triquetrum of left wrist with routine healing, subsequent encounter        Plan:  1. Left triquetral avulsion fracture, stable with continued healing.  2. Should continue with cock-up wrist splint but begin weaning out performing gentle range of motion.  3. Recommend over-the-counter Tylenol as needed.  4. Follow-up in 4 weeks for anticipated final evaluation and " imaging.  5. Questions and concerns answered.      Bailee Quinones PA-C  09/17/21  09:41 RENZOT      Dragon disclaimer:  Much of this encounter note is an electronic transcription/translation of spoken language to printed text. The electronic translation of spoken language may permit erroneous, or at times, nonsensical words or phrases to be inadvertently transcribed; Although I have reviewed the note for such errors, some may still exist.

## 2021-10-11 ENCOUNTER — OFFICE VISIT (OUTPATIENT)
Dept: ORTHOPEDIC SURGERY | Facility: CLINIC | Age: 76
End: 2021-10-11

## 2021-10-11 VITALS
SYSTOLIC BLOOD PRESSURE: 130 MMHG | DIASTOLIC BLOOD PRESSURE: 72 MMHG | BODY MASS INDEX: 25.1 KG/M2 | WEIGHT: 147.05 LBS | HEIGHT: 64 IN

## 2021-10-11 DIAGNOSIS — Z09 FRACTURE FOLLOW-UP: Primary | ICD-10-CM

## 2021-10-11 DIAGNOSIS — S62.112D CLOSED CHIP FRACTURE OF LEFT TRIQUETRUM WITH ROUTINE HEALING, SUBSEQUENT ENCOUNTER: ICD-10-CM

## 2021-10-11 PROCEDURE — 99213 OFFICE O/P EST LOW 20 MIN: CPT | Performed by: PHYSICIAN ASSISTANT

## 2021-10-11 NOTE — PROGRESS NOTES
"    [facility] [place] [facility] Note        Subjective     Pain of the Right Wrist and Follow-up (2 months from DOI 07/30/21 - Closed nondisplaced fracture of triquetrum of left wrist with routine healing)       HPI    Christina Guillen is a 76 y.o. female.  Patient returns today for follow-up triquetral avulsion fracture to her left wrist.  She is doing much better and denies any pain.  No reported numbness or tingling into the extremity or digits.      At this time she is complaining of bilateral knee pain.      No reported fever, chills, night sweats or other constitutional symptoms.        Objective      Physical Exam  /72   Ht 162.6 cm (64.02\")   Wt 66.7 kg (147 lb 0.8 oz)   BMI 25.23 kg/m²     Body mass index is 25.23 kg/m².      Ortho Exam  Left wrist  Skin: Grossly intact with any redness, warmth, swelling.    Tenderness:  No palpable discomfort on today's exam  Motion: Full range of motion of the wrist.  Able to make a composite fist with some stiffness still noted.  Motor: Grossly intact R/U/M/AIN/PIN  Sensory: Grossly intact LT: R/U/M  Vascular: Brisk capillary refill, 2+ radial pulse      Imaging/Studies  Ordered left wrist plain films.  Imaging read/interpreted by Dr. Morales.    Left Wrist X-Ray  Indication: Pain  AP, Lateral, and Oblique views     Findings:  Healing of left wrist triquetral fracture  No bony lesion  Normal soft tissues  Normal joint spaces     prior studies were available for comparison.      Assessment:  1. Fracture follow-up    2. Closed chip fracture of left triquetrum with routine healing, subsequent encounter        Plan:  1. Left triquetral avulsion fracture, stable and healed.  2. May continue with activities as tolerated  3. Recommend over-the-counter Tylenol as needed.  4. Follow-up as needed for her left wrist.  Patient is also complaining of bilateral knee pain.  She will schedule follow-up appointment for evaluation of her knees.  5. Questions and concerns " answered.      Bailee Quinones PA-C  10/14/21  10:23 RENZOT      Dragon disclaimer:  Much of this encounter note is an electronic transcription/translation of spoken language to printed text. The electronic translation of spoken language may permit erroneous, or at times, nonsensical words or phrases to be inadvertently transcribed; Although I have reviewed the note for such errors, some may still exist.

## 2022-05-08 ENCOUNTER — APPOINTMENT (OUTPATIENT)
Dept: CT IMAGING | Facility: HOSPITAL | Age: 77
End: 2022-05-08

## 2022-05-08 ENCOUNTER — HOSPITAL ENCOUNTER (EMERGENCY)
Facility: HOSPITAL | Age: 77
Discharge: HOME | End: 2022-05-08
Attending: EMERGENCY MEDICINE | Admitting: EMERGENCY MEDICINE

## 2022-05-08 VITALS
HEART RATE: 64 BPM | TEMPERATURE: 97.6 F | BODY MASS INDEX: 24.75 KG/M2 | SYSTOLIC BLOOD PRESSURE: 129 MMHG | DIASTOLIC BLOOD PRESSURE: 88 MMHG | RESPIRATION RATE: 18 BRPM | WEIGHT: 145 LBS | OXYGEN SATURATION: 98 % | HEIGHT: 64 IN

## 2022-05-08 DIAGNOSIS — K92.2 ACUTE GI BLEEDING: ICD-10-CM

## 2022-05-08 DIAGNOSIS — K64.9 HEMORRHOIDS, UNSPECIFIED HEMORRHOID TYPE: Primary | ICD-10-CM

## 2022-05-08 LAB
ABO GROUP BLD: NORMAL
ALBUMIN SERPL-MCNC: 4.3 G/DL (ref 3.5–5.2)
ALBUMIN/GLOB SERPL: 1.5 G/DL
ALP SERPL-CCNC: 95 U/L (ref 39–117)
ALT SERPL W P-5'-P-CCNC: 11 U/L (ref 1–33)
ANION GAP SERPL CALCULATED.3IONS-SCNC: 9 MMOL/L (ref 5–15)
AST SERPL-CCNC: 19 U/L (ref 1–32)
BASOPHILS # BLD AUTO: 0.07 10*3/MM3 (ref 0–0.2)
BASOPHILS NFR BLD AUTO: 0.8 % (ref 0–1.5)
BILIRUB SERPL-MCNC: 0.3 MG/DL (ref 0–1.2)
BLD GP AB SCN SERPL QL: NEGATIVE
BUN SERPL-MCNC: 21 MG/DL (ref 8–23)
BUN/CREAT SERPL: 18.1 (ref 7–25)
CALCIUM SPEC-SCNC: 9.5 MG/DL (ref 8.6–10.5)
CHLORIDE SERPL-SCNC: 104 MMOL/L (ref 98–107)
CO2 SERPL-SCNC: 25 MMOL/L (ref 22–29)
CREAT SERPL-MCNC: 1.16 MG/DL (ref 0.57–1)
DEPRECATED RDW RBC AUTO: 45.5 FL (ref 37–54)
DEVELOPER EXPIRATION DATE: NORMAL
DEVELOPER LOT NUMBER: NORMAL
EGFRCR SERPLBLD CKD-EPI 2021: 48.4 ML/MIN/1.73
EOSINOPHIL # BLD AUTO: 0.18 10*3/MM3 (ref 0–0.4)
EOSINOPHIL NFR BLD AUTO: 2.2 % (ref 0.3–6.2)
ERYTHROCYTE [DISTWIDTH] IN BLOOD BY AUTOMATED COUNT: 13.5 % (ref 12.3–15.4)
EXPIRATION DATE: NORMAL
FECAL OCCULT BLOOD SCREEN, POC: NEGATIVE
GLOBULIN UR ELPH-MCNC: 2.9 GM/DL
GLUCOSE SERPL-MCNC: 105 MG/DL (ref 65–99)
HCT VFR BLD AUTO: 32.8 % (ref 34–46.6)
HGB BLD-MCNC: 11 G/DL (ref 12–15.9)
HOLD SPECIMEN: NORMAL
IMM GRANULOCYTES # BLD AUTO: 0.02 10*3/MM3 (ref 0–0.05)
IMM GRANULOCYTES NFR BLD AUTO: 0.2 % (ref 0–0.5)
LYMPHOCYTES # BLD AUTO: 2.14 10*3/MM3 (ref 0.7–3.1)
LYMPHOCYTES NFR BLD AUTO: 25.6 % (ref 19.6–45.3)
Lab: NORMAL
MCH RBC QN AUTO: 30.6 PG (ref 26.6–33)
MCHC RBC AUTO-ENTMCNC: 33.5 G/DL (ref 31.5–35.7)
MCV RBC AUTO: 91.4 FL (ref 79–97)
MONOCYTES # BLD AUTO: 1.07 10*3/MM3 (ref 0.1–0.9)
MONOCYTES NFR BLD AUTO: 12.8 % (ref 5–12)
NEGATIVE CONTROL: NEGATIVE
NEUTROPHILS NFR BLD AUTO: 4.88 10*3/MM3 (ref 1.7–7)
NEUTROPHILS NFR BLD AUTO: 58.4 % (ref 42.7–76)
NRBC BLD AUTO-RTO: 0 /100 WBC (ref 0–0.2)
PLATELET # BLD AUTO: 241 10*3/MM3 (ref 140–450)
PMV BLD AUTO: 11.4 FL (ref 6–12)
POSITIVE CONTROL: POSITIVE
POTASSIUM SERPL-SCNC: 4.3 MMOL/L (ref 3.5–5.2)
PROT SERPL-MCNC: 7.2 G/DL (ref 6–8.5)
RBC # BLD AUTO: 3.59 10*6/MM3 (ref 3.77–5.28)
RH BLD: POSITIVE
SODIUM SERPL-SCNC: 138 MMOL/L (ref 136–145)
T&S EXPIRATION DATE: NORMAL
WBC NRBC COR # BLD: 8.36 10*3/MM3 (ref 3.4–10.8)
WHOLE BLOOD HOLD SPECIMEN: NORMAL
WHOLE BLOOD HOLD SPECIMEN: NORMAL

## 2022-05-08 PROCEDURE — 86850 RBC ANTIBODY SCREEN: CPT | Performed by: EMERGENCY MEDICINE

## 2022-05-08 PROCEDURE — 74176 CT ABD & PELVIS W/O CONTRAST: CPT

## 2022-05-08 PROCEDURE — 99284 EMERGENCY DEPT VISIT MOD MDM: CPT

## 2022-05-08 PROCEDURE — 80053 COMPREHEN METABOLIC PANEL: CPT | Performed by: EMERGENCY MEDICINE

## 2022-05-08 PROCEDURE — 86900 BLOOD TYPING SEROLOGIC ABO: CPT | Performed by: EMERGENCY MEDICINE

## 2022-05-08 PROCEDURE — 82270 OCCULT BLOOD FECES: CPT | Performed by: EMERGENCY MEDICINE

## 2022-05-08 PROCEDURE — 36415 COLL VENOUS BLD VENIPUNCTURE: CPT

## 2022-05-08 PROCEDURE — 86901 BLOOD TYPING SEROLOGIC RH(D): CPT

## 2022-05-08 PROCEDURE — 86901 BLOOD TYPING SEROLOGIC RH(D): CPT | Performed by: EMERGENCY MEDICINE

## 2022-05-08 PROCEDURE — 85025 COMPLETE CBC W/AUTO DIFF WBC: CPT | Performed by: EMERGENCY MEDICINE

## 2022-05-08 PROCEDURE — 86900 BLOOD TYPING SEROLOGIC ABO: CPT

## 2022-05-08 RX ORDER — SODIUM CHLORIDE 0.9 % (FLUSH) 0.9 %
10 SYRINGE (ML) INJECTION AS NEEDED
Status: DISCONTINUED | OUTPATIENT
Start: 2022-05-08 | End: 2022-05-08 | Stop reason: HOSPADM

## 2022-05-08 RX ORDER — DOXYCYCLINE HYCLATE 100 MG/1
CAPSULE ORAL EVERY 12 HOURS SCHEDULED
COMMUNITY
End: 2022-09-22

## 2022-05-08 RX ORDER — HYDROCORTISONE 25 MG/G
CREAM TOPICAL
COMMUNITY
End: 2022-09-22

## 2022-05-08 RX ADMIN — SODIUM CHLORIDE 1000 ML: 9 INJECTION, SOLUTION INTRAVENOUS at 10:58

## 2022-05-08 NOTE — ED PROVIDER NOTES
Subjective   77-year-old female presents for evaluation of GI bleeding.  The patient has a known history of hemorrhoids and reports that she had a hemorrhoidal bleed in 1995 but otherwise has not had any significant bleeding the past.  She does take aspirin but does not take any anticoagulation.  She reports that she had a bleeding episode 4 days ago that she was able to control but the days bleeding episode was more prominent and more difficult to control.  She reports primarily bright red blood has been passed and is only passed whenever she has a bowel move.  She reports that the bowel movement itself looks normal and seems to be separate from the blood.  No abdominal pain.  No chest pain.  No cough or shortness of breath.  No fever, body aches, or chills.  No new or different medications.  No other acute complaints.          Review of Systems   Constitutional: Negative for chills, fatigue and fever.   HENT: Negative for congestion, ear pain, postnasal drip, sinus pressure and sore throat.    Eyes: Negative for pain, redness and visual disturbance.   Respiratory: Negative for cough, chest tightness and shortness of breath.    Cardiovascular: Negative for chest pain, palpitations and leg swelling.   Gastrointestinal: Positive for blood in stool. Negative for abdominal pain, anal bleeding, diarrhea, nausea and vomiting.   Endocrine: Negative for polydipsia and polyuria.   Genitourinary: Negative for difficulty urinating, dysuria, frequency and urgency.   Musculoskeletal: Negative for arthralgias, back pain and neck pain.   Skin: Negative for pallor and rash.   Allergic/Immunologic: Negative for environmental allergies and immunocompromised state.   Neurological: Negative for dizziness, weakness and headaches.   Hematological: Negative for adenopathy.   Psychiatric/Behavioral: Negative for confusion, self-injury and suicidal ideas. The patient is not nervous/anxious.    All other systems reviewed and are  negative.      Past Medical History:   Diagnosis Date   • Breast cancer (HCC) 1975    right    • Cancer (HCC)    • Disease of thyroid gland    • Hypertension        Allergies   Allergen Reactions   • Levofloxacin Hives   • Erythromycin Rash   • Macrobid [Nitrofurantoin] Rash   • Penicillins Rash   • Septra [Sulfamethoxazole-Trimethoprim] Rash       Past Surgical History:   Procedure Laterality Date   • BREAST BIOPSY Right 1975   • MASTECTOMY Right 1975       Family History   Problem Relation Age of Onset   • Heart valve disorder Mother    • Kidney failure Mother    • Breast cancer Neg Hx    • Ovarian cancer Neg Hx        Social History     Socioeconomic History   • Marital status:    Tobacco Use   • Smoking status: Never Smoker   • Smokeless tobacco: Never Used   Substance and Sexual Activity   • Alcohol use: Never   • Drug use: Never   • Sexual activity: Defer           Objective   Physical Exam  Vitals and nursing note reviewed. Exam conducted with a chaperone present.   Constitutional:       General: She is not in acute distress.     Appearance: Normal appearance. She is well-developed. She is not toxic-appearing or diaphoretic.   HENT:      Head: Normocephalic and atraumatic.      Right Ear: External ear normal.      Left Ear: External ear normal.      Nose: Nose normal.   Eyes:      General: Lids are normal.      Pupils: Pupils are equal, round, and reactive to light.   Neck:      Trachea: No tracheal deviation.   Cardiovascular:      Rate and Rhythm: Normal rate and regular rhythm.      Pulses: No decreased pulses.      Heart sounds: Normal heart sounds. No murmur heard.    No friction rub. No gallop.   Pulmonary:      Effort: Pulmonary effort is normal. No respiratory distress.      Breath sounds: Normal breath sounds. No decreased breath sounds, wheezing, rhonchi or rales.   Abdominal:      General: Bowel sounds are normal.      Palpations: Abdomen is soft.      Tenderness: There is no abdominal  tenderness. There is no guarding or rebound.   Genitourinary:      Musculoskeletal:         General: No deformity. Normal range of motion.      Cervical back: Normal range of motion and neck supple.   Lymphadenopathy:      Cervical: No cervical adenopathy.   Skin:     General: Skin is warm and dry.      Findings: No rash.   Neurological:      Mental Status: She is alert and oriented to person, place, and time.      Cranial Nerves: No cranial nerve deficit.      Sensory: No sensory deficit.   Psychiatric:         Speech: Speech normal.         Behavior: Behavior normal.         Thought Content: Thought content normal.         Judgment: Judgment normal.         Procedures           ED Course                                                 MDM  Number of Diagnoses or Management Options  Acute GI bleeding: new and requires workup  Hemorrhoids, unspecified hemorrhoid type: new and requires workup  Diagnosis management comments: The patient presents with complaint of bleeding per rectum and has a known history of hemorrhoids.    The bleeding appears to be coming from the hemorrhoids and there has been no bleeding while here in the ER.    Blood count levels are stable and CT scan of the abdomen pelvis shows no acute abnormality.    The patient will be referred for outpatient colorectal evaluation and primary care physician evaluation.    Advised to use sitz bath's 20 minutes at a time multiple times a day and follow-up with primary care physician as soon as possible.       Amount and/or Complexity of Data Reviewed  Clinical lab tests: ordered and reviewed  Tests in the radiology section of CPT®: ordered and reviewed  Obtain history from someone other than the patient: yes  Review and summarize past medical records: yes  Independent visualization of images, tracings, or specimens: yes        Final diagnoses:   Hemorrhoids, unspecified hemorrhoid type   Acute GI bleeding       ED Disposition  ED Disposition     ED  Disposition   Discharge    Condition   Stable    Comment   --             Annie Valdez MD  [address] [facility] [address]  [phone]    In 1 week      PATIENT CONNECTION - [facility] [address]  [phone]  Schedule an appointment as soon as possible for a visit            Medication List      No changes were made to your prescriptions during this visit.          Charles Handy MD  05/08/22 0527

## 2022-05-08 NOTE — DISCHARGE INSTRUCTIONS
Perform sitz bath's 20 minutes at a time multiple times a day to help improve hemorrhoids.    Follow-up with colorectal surgery for further outpatient evaluation of hemorrhoids.    Return to the ER with any further concern.

## 2022-05-18 ENCOUNTER — LAB (OUTPATIENT)
Dept: LAB | Facility: HOSPITAL | Age: 77
End: 2022-05-18

## 2022-05-18 ENCOUNTER — OFFICE VISIT (OUTPATIENT)
Dept: INTERNAL MEDICINE | Facility: CLINIC | Age: 77
End: 2022-05-18

## 2022-05-18 VITALS
BODY MASS INDEX: 25.88 KG/M2 | TEMPERATURE: 96.6 F | SYSTOLIC BLOOD PRESSURE: 126 MMHG | HEIGHT: 64 IN | WEIGHT: 151.6 LBS | DIASTOLIC BLOOD PRESSURE: 74 MMHG | RESPIRATION RATE: 18 BRPM | OXYGEN SATURATION: 97 % | HEART RATE: 57 BPM

## 2022-05-18 DIAGNOSIS — E78.5 HYPERLIPIDEMIA, UNSPECIFIED HYPERLIPIDEMIA TYPE: ICD-10-CM

## 2022-05-18 DIAGNOSIS — I10 HYPERTENSION, UNSPECIFIED TYPE: ICD-10-CM

## 2022-05-18 DIAGNOSIS — D22.9 ATYPICAL NEVI: ICD-10-CM

## 2022-05-18 DIAGNOSIS — E03.9 HYPOTHYROIDISM, UNSPECIFIED TYPE: Primary | ICD-10-CM

## 2022-05-18 DIAGNOSIS — N64.4 BREAST PAIN, LEFT: ICD-10-CM

## 2022-05-18 DIAGNOSIS — Z87.440 HISTORY OF UTI: ICD-10-CM

## 2022-05-18 DIAGNOSIS — K62.5 RECTAL BLEEDING: ICD-10-CM

## 2022-05-18 DIAGNOSIS — R82.90 ABNORMAL URINE: ICD-10-CM

## 2022-05-18 DIAGNOSIS — N39.0 FREQUENT UTI: ICD-10-CM

## 2022-05-18 PROBLEM — K21.9 GASTROESOPHAGEAL REFLUX DISEASE WITHOUT ESOPHAGITIS: Status: ACTIVE | Noted: 2022-05-18

## 2022-05-18 PROBLEM — G89.29 CHRONIC PAIN OF RIGHT KNEE: Status: ACTIVE | Noted: 2022-05-18

## 2022-05-18 PROBLEM — M25.561 CHRONIC PAIN OF RIGHT KNEE: Status: ACTIVE | Noted: 2022-05-18

## 2022-05-18 LAB
BILIRUB BLD-MCNC: NEGATIVE MG/DL
CLARITY, POC: CLEAR
COLOR UR: YELLOW
EXPIRATION DATE: ABNORMAL
GLUCOSE UR STRIP-MCNC: NEGATIVE MG/DL
KETONES UR QL: NEGATIVE
LEUKOCYTE EST, POC: ABNORMAL
Lab: ABNORMAL
NITRITE UR-MCNC: NEGATIVE MG/ML
PH UR: 6 [PH] (ref 5–8)
PROT UR STRIP-MCNC: NEGATIVE MG/DL
RBC # UR STRIP: NEGATIVE /UL
SP GR UR: 1.01 (ref 1–1.03)
UROBILINOGEN UR QL: NORMAL

## 2022-05-18 PROCEDURE — 87186 SC STD MICRODIL/AGAR DIL: CPT | Performed by: NURSE PRACTITIONER

## 2022-05-18 PROCEDURE — 81003 URINALYSIS AUTO W/O SCOPE: CPT | Performed by: NURSE PRACTITIONER

## 2022-05-18 PROCEDURE — 87086 URINE CULTURE/COLONY COUNT: CPT | Performed by: NURSE PRACTITIONER

## 2022-05-18 PROCEDURE — 87088 URINE BACTERIA CULTURE: CPT | Performed by: NURSE PRACTITIONER

## 2022-05-18 PROCEDURE — 99214 OFFICE O/P EST MOD 30 MIN: CPT | Performed by: NURSE PRACTITIONER

## 2022-05-18 NOTE — PROGRESS NOTES
Chief Complaint  Urinary Frequency (Establish care)    Subjective          Christina Guillen presents to [facility]  History of Present Illness    Patient here today with concern for chronic UTIs and hemorrhoids.      The patient is accompanied by an adult .    The patient presents today to establish care. She is a former patient of Dr. Annie Valdez and was sent in today as a follow-up from an emergency department visit.      Arthritis in knee -right.  The patient states that she was having some knee pain. She reports that she saw a provider at [facility] and was giving a knee injection. The patient states that her knee is feeling better.      Gastroesophageal reflux disease   The patient states that she has heartburn and reflux infrequently. She states that it depends what she has eaten. The patient is not currently taking any medication to manage symptoms.      History of breast cancer   The patient reports that she had a mastectomy of the right breast in 1975.  . She states that she has not seen oncology in a while. The patient reports that oncology did not mention following up.      Cataracts   The patient reports that the cataracts were surgically removed in 2006.     Diverticulitis    The patient reports that her last episode was in 1995.      Hypothyroidism    She is currently taking levothyroxine 50 mcg daily.     Hypertension   The patient's blood pressure in within the normal range during today's visit. The patient reports that when she was in the emergency room her blood pressure was 129/88.  The patient is currently taking atenolol 50 mg and Norvasc 5 mg daily.      Hyperlipidemia   The patient is currently taking pravastatin 20 mg at bedtime to control cholesterol.      Family medical history    The patient reports that her mother had heart disease.      Social history    The patient is . She denies smoking, alcohol use  or illicit drug use.      Soreness in the left lateral breast   The patient reports that she has had some soreness where the seam of her bra is. She states that she has has had this pain for a few weeks.      Hemorrhoids   The patient reports that the hemorrhoids are not causing her discomfort at this time. She states that the only time she had any issue with them is 45 years ago during child birth. The patient states that on Sunday she went to the restroom before going to [employer] and the toilet was full of bright red blood. The emergency room thought that the bleeding could be coming from the hemorrhoids. Blood work and a CAT scan were performed at the time of the visit. The patient reports that there has not been any bleeding since her emergency room visit. The patient denies having any constipation, however; she reports that she did have diarrhea several days before she had the bleeding. She states that the diarrhea did irritate things little.      Urinary tract infection   The patient states that she had a urinary tract infection in 07/2021. The patient reports that her symptoms started on 07/04/2021. She stated that because it was a holiday weekend, she started taking AZO until she was able to get in to a walk-in clinic that Tuesday. The patient reports that she has another urinary tract in 10/2021 or 11/2021 and then again in 01/2022, 03/2022 and 05/2022.      Hearing loss   The patient reports that she was having some hearing issues in 09/2021. She states the she had lost the hearing in her left ear.  The patient states that she was unstable and this went on for approximately 6 weeks. She reports that she saw Dr. Monk in [place], [place]. She states that Dr. Monk removed wax and fluid from her ear. Dr. Monk put a tube in her, she states that she noticed the change immediately and she notes that she could hear better.      Spot on outer lip   Patient recently noticed a new spot on her lip. The patient states that he  "was followed by Dr. Melton in the past, however; he has retired.      Healthcare maintenance   The patient reports that she has had a flu vaccination in the fall. The patient has not had a COVID-19 vaccination.  The patient states that she had a DEXA scan done in 2019 at [facility]. She thinks she may have had a DEXA scan done this spring. The patient is due for a mammogram and colonoscopy.               No new chest pain, shortness of breath, headaches, visual changes, dizziness, palpitations, syncope, swelling in the lower extremities, or shortness of breath when laying down. No abdominal pain, constipation, hematuria or hematochezia, or dysuria. she has a 1 mm light, brown, round area there.      Objective   Vital Signs:   /74 (BP Location: Right arm, Patient Position: Sitting, Cuff Size: Adult)   Pulse 57   Temp 96.6 °F (35.9 °C) (Temporal)   Resp 18   Ht 161.3 cm (63.5\")   Wt 68.8 kg (151 lb 9.6 oz)   SpO2 97%   BMI 26.43 kg/m²     Physical Exam  Vitals and nursing note reviewed.   Constitutional:       Appearance: She is well-developed.   HENT:      Head: Normocephalic and atraumatic.      Right Ear: External ear normal.      Left Ear: External ear normal.      Ears:      Comments: eac dry cerumen 30%     Nose: Nose normal.   Neck:      Thyroid: No thyromegaly.   Cardiovascular:      Rate and Rhythm: Normal rate and regular rhythm.   Pulmonary:      Effort: Pulmonary effort is normal.      Breath sounds: Normal breath sounds.   Chest:   Breasts:      Right: Normal. No axillary adenopathy or supraclavicular adenopathy.      Left: Normal. No axillary adenopathy or supraclavicular adenopathy.       Abdominal:      General: Bowel sounds are normal. There is no distension.      Palpations: Abdomen is soft.      Tenderness: There is no abdominal tenderness.   Lymphadenopathy:      Cervical: No cervical adenopathy.      Upper Body:      Right upper body: No supraclavicular, axillary or pectoral " adenopathy.      Left upper body: No supraclavicular, axillary or pectoral adenopathy.   Skin:     Capillary Refill: Capillary refill takes 2 to 3 seconds.      Comments: . She does have not only the 1 mm light brown area by the left upper lip, but also a 6 mm irregular, erythematous dry scaly area mid back. We will have her see dermatology for evaluation of these areas.   Neurological:      Mental Status: She is alert and oriented to person, place, and time.   Psychiatric:         Mood and Affect: Mood normal.         Behavior: Behavior normal.        Result Review :                 Assessment and Plan    Diagnoses and all orders for this visit:    1. Hypothyroidism, unspecified type (Primary)    2. Hypertension, unspecified type    3. Hyperlipidemia, unspecified hyperlipidemia type  -     Lipid Panel; Future    4. Breast pain, left  -     US breast left complete; Future  -     Mammo diagnostic digital tomosynthesis left w CAD; Future    5. Frequent UTI  -     Ambulatory Referral to Urology    6. Rectal bleeding  -     Cancel: Ambulatory Referral For Screening Colonoscopy  -     Ambulatory Referral For Screening Colonoscopy    7. History of UTI  -     POC Urinalysis Dipstick, Automated; Future  -     POC Urinalysis Dipstick, Automated  -     Urine Culture - , Urine, Random Void; Future  -     Urine Culture - Urine, Urine, Random Void    8. Atypical nevi  -     Ambulatory Referral to Dermatology    9. Abnormal urine  -     Urine Culture - , Urine, Random Void; Future  -     Urine Culture - Urine, Urine, Random Void      Hyperlipidemia        - Continue taking pravastatin for and that's 20 mg at bedtime. I advised the patient to limit any alcohol, avoid grapefruit products.    Soreness in the left lateral breast        - Mammogram and ultra sound ordered     Hemorrhoid's        - We will get the patient scheduled for a colonoscopy    Urinary tract infection        - Referral to Urology was sent        - She will sign  a release of medical records so we can obtain records from Dr. Valdez's office     Hearing loss        - I did asked the patient to follow through with ENT. She has seen a Dr. Monk in [place], ENT in the past for earwax removal and her left ear tube. She is  gonna follow back up with him in regards to the wax.    Spot on outer lip        - we will get the patient set up for a dermatology appointment.     Healthcare maintenance        - The patient has not had the COVID-19 vaccination. I have explained the risks and benefits of the COVID-19 vaccination to the patient.         - She will sign a release of medical records so we can obtain the DEXA scan results.         - Mammogram and ultra sound ordered    I'll see patient back in July for wellness.                Discussed the patient's BMI with her. BMI is above normal parameters. Recommendations include: educational material.    AWV: scheduled  A1C: No results found for: HGBA1C   ACP: Advance Care Planning   ACP discussion was held with the patient during this visit. Patient does not have an advance directive, information provided.   Mammogram: na  Colonoscopy: na    Follow Up   Return 8/1/22 after, for fasting, Medicare Wellness, Annual.  Patient was given instructions and counseling regarding her condition or for health maintenance advice. Please see specific information pulled into the AVS if appropriate.     RTC/call  If symptoms worsen  Meds MOA and SE's reviewed and pt v/u    Transcribed from ambient dictation for RUBINA Jarquin by Fadia Otto.  05/18/22   17:55 EDT    Patient verbalized consent to the visit recording.

## 2022-05-18 NOTE — PATIENT INSTRUCTIONS
Advance Care Planning and Advance Directives     You make decisions on a daily basis - decisions about where you want to live, your career, your home, your life. Perhaps one of the most important decisions you face is your choice for future medical care. Take time to talk with your family and your healthcare team and start planning today.  Advance Care Planning is a process that can help you:  Understand possible future healthcare decisions in light of your own experiences  Reflect on those decision in light of your goals and values  Discuss your decisions with those closest to you and the healthcare professionals that care for you  Make a plan by creating a document that reflects your wishes    Surrogate Decision Maker  In the event of a medical emergency, which has left you unable to communicate or to make your own decisions, you would need someone to make decisions for you.  It is important to discuss your preferences for medical treatment with this person while you are in good health.     Qualities of a surrogate decision maker:  Willing to take on this role and responsibility  Knows what you want for future medical care  Willing to follow your wishes even if they don't agree with them  Able to make difficult medical decisions under stressful circumstances    Advance Directives  These are legal documents you can create that will guide your healthcare team and decision maker(s) when needed. These documents can be stored in the electronic medical record.    Living Will - a legal document to guide your care if you have a terminal condition or a serious illness and are unable to communicate. States vary by statute in document names/types, but most forms may include one or more of the following:        -  Directions regarding life-prolonging treatments        -  Directions regarding artificially provided nutrition/hydration        -  Choosing a healthcare decision maker        -  Direction regarding organ/tissue  donation    Durable Power of  for Healthcare - this document names an -in-fact to make medical decisions for you, but it may also allow this person to make personal and financial decisions for you. Please seek the advice of an  if you need this type of document.    **Advance Directives are not required and no one may discriminate against you if you do not sign one.    Medical Orders  Many states allow specific forms/orders signed by your physician to record your wishes for medical treatment in your current state of health. This form, signed in personal communication with your physician, addresses resuscitation and other medical interventions that you may or may not want.      For more information or to schedule a time with a [facility] Advance Care Planning Facilitator contact: [facility] or call [phone] and someone will contact you directly.MyPlate from USDA    MyPlate is an outline of a general healthy diet based on the 2010 Dietary Guidelines for Americans, from the U.S. Department of Agriculture (USDA). It sets guidelines for how To follow MyPlate recommendations:  Eat a wide variety of fruits and vegetables, grains, and protein foods.  Serve smaller portions and eat less food throughout the day.  Limit portion sizes to avoid overeating.  Enjoy your food.  Get at least 150 minutes of exercise every week. This is about 30 minutes each day, 5 or more days per week.  It can be difficult to have every meal look like MyPlate. Think about MyPlate as eating guidelines for an entire day, rather than each individual meal.  Fruits and vegetables  Make half of your plate fruits and vegetables.  Eat many different colors of fruits and vegetables each day.  For a 2,000 calorie daily food plan, eat:  2½ cups of vegetables every day.  2 cups of fruit every day.  1 cup is equal to:  1 cup raw or cooked vegetables.  1 cup raw fruit.  1 medium-sized orange, apple, or banana.  1 cup 100%  fruit or vegetable juice.  2 cups raw leafy greens, such as lettuce, spinach, or kale.  ½ cup dried fruit.  Grains  One fourth of your plate should be grains.  Make at least half of the grains you eat each day whole grains.  For a 2,000 calorie daily food plan, eat 6 oz of grains every day.  1 oz is equal to:  1 slice bread.  1 cup cereal.  ½ cup cooked rice, cereal, or pasta.  Protein  One fourth of your plate should be protein.  Eat a wide variety of protein foods, including meat, poultry, fish, eggs, beans, nuts, and tofu.  For a 2,000 calorie daily food plan, eat 5½ oz of protein every day.  1 oz is equal to:  1 oz meat, poultry, or fish.  ¼ cup cooked beans.  1 egg.  ½ oz nuts or seeds.  1 Tbsp peanut butter.  Dairy  Drink fat-free or low-fat (1%) milk.  Eat or drink dairy as a side to meals.  For a 2,000 calorie daily food plan, eat or drink 3 cups of dairy every day.  1 cup is equal to:  1 cup milk, yogurt, cottage cheese, or soy milk (soy beverage).  2 oz processed cheese.  1½ oz natural cheese.  Fats, oils, salt, and sugars  Only small amounts of oils are recommended.  Avoid foods that are high in calories and low in nutritional value (empty calories), like foods high in fat or added sugars.  Choose foods that are low in salt (sodium). Choose foods that have less than 140 milligrams (mg) of sodium per serving.  Drink water instead of sugary drinks. Drink enough water each day to keep your urine pale yellow.  Where to find support  Work with your health care provider or a nutrition specialist (dietitian) to develop a customized eating plan that is right for you.  Download an nikole (mobile application) to help you track your daily food intake.  Where to find more information  Go to ChooseMyPlate.gov for more information.  Summary  MyPlate is a general guideline for healthy eating from the USDA. It is based on the 2010 Dietary Guidelines for Americans.  In general, fruits and vegetables should take up ½ of your  plate, grains should take up ¼ of your plate, and protein should take up ¼ of your plate.  This information is not intended to replace advice given to you by your health care provider. Make sure you discuss any questions you have with your health care provider.  Document Revised: 05/21/2020 Document Reviewed: 03/19/2018  Elsevier Patient Education © 2021 [employer]vier Inc.  ACP information pamphlet given to the patient.  ACP information provided on the AVS.

## 2022-05-19 DIAGNOSIS — N39.0 URINARY TRACT INFECTION WITHOUT HEMATURIA, SITE UNSPECIFIED: Primary | ICD-10-CM

## 2022-05-19 RX ORDER — CEFDINIR 300 MG/1
300 CAPSULE ORAL 2 TIMES DAILY
Qty: 20 CAPSULE | Refills: 0 | Status: SHIPPED | OUTPATIENT
Start: 2022-05-19 | End: 2022-09-22

## 2022-05-20 LAB — BACTERIA SPEC AEROBE CULT: ABNORMAL

## 2022-06-06 ENCOUNTER — TELEPHONE (OUTPATIENT)
Dept: INTERNAL MEDICINE | Facility: CLINIC | Age: 77
End: 2022-06-06

## 2022-06-06 NOTE — TELEPHONE ENCOUNTER
Please call patient as she is scheduled for a diagnostic left breast ultrasound however they have been unable to reach her.  Please let her know that these are important she have completed.  Please send message back to me when she spoken with patient.

## 2022-06-09 ENCOUNTER — TELEPHONE (OUTPATIENT)
Dept: INTERNAL MEDICINE | Facility: CLINIC | Age: 77
End: 2022-06-09

## 2022-06-10 PROBLEM — N64.4 BREAST PAIN, LEFT: Status: ACTIVE | Noted: 2022-06-10

## 2022-06-27 ENCOUNTER — HOSPITAL ENCOUNTER (OUTPATIENT)
Dept: MAMMOGRAPHY | Facility: HOSPITAL | Age: 77
Discharge: HOME | End: 2022-06-27
Admitting: NURSE PRACTITIONER

## 2022-06-27 DIAGNOSIS — N64.4 BREAST PAIN, LEFT: ICD-10-CM

## 2022-06-27 PROCEDURE — 77065 DX MAMMO INCL CAD UNI: CPT | Performed by: RADIOLOGY

## 2022-06-27 PROCEDURE — 77065 DX MAMMO INCL CAD UNI: CPT

## 2022-06-27 PROCEDURE — G0279 TOMOSYNTHESIS, MAMMO: HCPCS

## 2022-06-27 PROCEDURE — G0279 TOMOSYNTHESIS, MAMMO: HCPCS | Performed by: RADIOLOGY

## 2022-07-05 ENCOUNTER — OFFICE VISIT (OUTPATIENT)
Dept: UROLOGY | Facility: CLINIC | Age: 77
End: 2022-07-05

## 2022-07-05 VITALS — WEIGHT: 145 LBS | BODY MASS INDEX: 24.75 KG/M2 | HEIGHT: 64 IN

## 2022-07-05 DIAGNOSIS — R39.9 LOWER URINARY TRACT SYMPTOMS (LUTS): Primary | ICD-10-CM

## 2022-07-05 DIAGNOSIS — N39.0 URINARY TRACT INFECTION WITHOUT HEMATURIA, SITE UNSPECIFIED: ICD-10-CM

## 2022-07-05 PROCEDURE — 99204 OFFICE O/P NEW MOD 45 MIN: CPT | Performed by: UROLOGY

## 2022-07-05 NOTE — PROGRESS NOTES
Office Visit New Urology      Patient Name: Christina Guillen  : 1945   MRN: [id number]     Chief Complaint:    Chief Complaint   Patient presents with   • Recurring UTI       Referring Provider: Laquita Gustafson APRN    History of Present Illness: Christina Guillen is a 77 y.o. female who presents to Urology today for evaluation of lower urinary tract symptoms and UTI.      The patient has a past medical history significant for hypertension, malignancy of the breast, GERD.  She was diagnosed with culture proven E. coli urinary tract infection in 2022.  She denies obstructive based urinary symptoms including intermittency, hesitancy, weakened stream.  She does report mild baseline irritative symptoms.  She denies significant urgency or incontinence.  She denies hematuria.      CT imaging obtained in 2022 at the time of possible GI bleed demonstrated no concerning findings within the kidney, ureter or urinary bladder.    She reports reduced water intake, mild irritative fluid intake.  She reports history of constipation.    She denies past urologic evaluation including instrumentation or procedure.       Subjective      Review of System: Review of Systems   Constitutional: Negative.  Negative for chills, fatigue, fever and unexpected weight change.   HENT: Negative.  Negative for sore throat.    Eyes: Negative.  Negative for visual disturbance.   Respiratory: Negative.  Negative for cough, chest tightness and shortness of breath.    Cardiovascular: Positive for leg swelling. Negative for chest pain.   Gastrointestinal: Negative.  Negative for blood in stool, constipation, diarrhea, nausea, rectal pain and vomiting.   Genitourinary: Negative.  Negative for decreased urine volume, difficulty urinating, dysuria, enuresis, flank pain, frequency, genital sores, hematuria and urgency.   Musculoskeletal: Positive for back pain. Negative for joint swelling.   Skin: Negative.  Negative for rash and wound.    Neurological: Negative.  Negative for seizures, speech difficulty, weakness and headaches.   Psychiatric/Behavioral: Negative.  Negative for confusion, sleep disturbance and suicidal ideas. The patient is not nervous/anxious.       I have reviewed the ROS documented by my clinical staff, updated appropriately and I agree. Mike Floyd MD    Past Medical History:   Past Medical History:   Diagnosis Date   • Breast cancer (HCC) 1975    right    • Cancer (HCC)    • Disease of thyroid gland    • Hypertension    • Urinary tract infection        Past Surgical History:   Past Surgical History:   Procedure Laterality Date   • BREAST BIOPSY Right 1975   • MASTECTOMY Right 1975       Family History:   Family History   Problem Relation Age of Onset   • Heart valve disorder Mother    • Kidney failure Mother    • Breast cancer Neg Hx    • Ovarian cancer Neg Hx        Social History:   Social History     Socioeconomic History   • Marital status:    Tobacco Use   • Smoking status: Never Smoker   • Smokeless tobacco: Never Used   Substance and Sexual Activity   • Alcohol use: Never   • Drug use: Never   • Sexual activity: Defer       Medications:     Current Outpatient Medications:   •  amLODIPine (NORVASC) 5 MG tablet, Take 5 mg by mouth Daily., Disp: , Rfl:   •  aspirin 81 MG EC tablet, Take 81 mg by mouth Daily., Disp: , Rfl:   •  atenolol (TENORMIN) 50 MG tablet, Take 50 mg by mouth Daily., Disp: , Rfl:   •  cefdinir (OMNICEF) 300 MG capsule, Take 1 capsule by mouth 2 (Two) Times a Day., Disp: 20 capsule, Rfl: 0  •  doxycycline (VIBRAMYCIN) 100 MG capsule, Every 12 (Twelve) Hours., Disp: , Rfl:   •  Hydrocortisone, Perianal, (ANUSOL-HC) 2.5 % rectal cream, Proctosol HC 2.5 % topical cream perineal applicator  APPLY A THIN LAYER TO THE AFFECTED AREA(S) BY TOPICAL ROUTE 2-4 TIMESDAILY, Disp: , Rfl:   •  levothyroxine (SYNTHROID, LEVOTHROID) 50 MCG tablet, Take 50 mcg by mouth Daily., Disp: , Rfl:   •  pravastatin  "(PRAVACHOL) 20 MG tablet, Take 20 mg by mouth Daily., Disp: , Rfl:   •  triamcinolone (KENALOG) 0.1 % cream, triamcinolone acetonide 0.1 % topical cream  APPLY A THIN LAYER TO THE AFFECTED AREA(S) BY TOPICAL ROUTE 1 TIME PER DAY FOR UP TO 14 DAYS, THEN OFF AT LEAST 7 DAYS, Disp: , Rfl:     Allergies:   Allergies   Allergen Reactions   • Levofloxacin Hives   • Erythromycin Rash   • Macrobid [Nitrofurantoin] Nausea Only   • Penicillins Rash   • Septra [Sulfamethoxazole-Trimethoprim] Rash       Objective     Physical Exam:   Vital Signs:   Vitals:    07/05/22 1004   Weight: 65.8 kg (145 lb)   Height: 161.3 cm (63.5\")     Body mass index is 25.28 kg/m².     Physical Exam  Vitals and nursing note reviewed.   Constitutional:       Appearance: Normal appearance.   HENT:      Head: Normocephalic and atraumatic.   Cardiovascular:      Comments: Well perfused  Pulmonary:      Effort: Pulmonary effort is normal.   Abdominal:      General: Abdomen is flat.      Palpations: Abdomen is soft.   Musculoskeletal:         General: Normal range of motion.   Skin:     General: Skin is warm and dry.   Neurological:      General: No focal deficit present.      Mental Status: She is alert and oriented to person, place, and time. Mental status is at baseline.   Psychiatric:         Mood and Affect: Mood normal.         Behavior: Behavior normal.         Thought Content: Thought content normal.         Judgment: Judgment normal.           Labs:   Brief Urine Lab Results  (Last result in the past 365 days)      Color   Clarity   Blood   Leuk Est   Nitrite   Protein   CREAT   Urine HCG        05/18/22 1420 Yellow   Clear   Negative   25 Tammy/ul   Negative   Negative                 Urine Culture    Urine Culture 5/18/22   Urine Culture 50,000 CFU/mL Escherichia coli (A)   (A) Abnormal value               Lab Results   Component Value Date    GLUCOSE 105 (H) 05/08/2022    CALCIUM 9.5 05/08/2022     05/08/2022    K 4.3 05/08/2022    CO2 " 25.0 05/08/2022     05/08/2022    BUN 21 05/08/2022    CREATININE 1.16 (H) 05/08/2022    BCR 18.1 05/08/2022    ANIONGAP 9.0 05/08/2022       Lab Results   Component Value Date    WBC 8.36 05/08/2022    HGB 11.0 (L) 05/08/2022    HCT 32.8 (L) 05/08/2022    MCV 91.4 05/08/2022     05/08/2022       Images:   CT Abdomen Pelvis Without Contrast    Result Date: 5/8/2022   1. No acute abdominopelvic process 2. Diffuse colonic diverticulosis with no evidence of diverticulitis  This report was finalized on 5/8/2022 11:31 AM by Glen Aguirre.        Measures:   Tobacco:   Christina Guillen  reports that she has never smoked. She has never used smokeless tobacco.. I have educated her on the risk of diseases from using tobacco products.           Urine Incontinence: ( NOUI)  Patient reports that she is not currently experiencing any symptoms of urinary incontinence.    Assessment / Plan      Assessment/Plan:   77 y.o. female is here today for evaluation due to history of lower urinary tract symptoms, UTI.  Culture proven E. coli infection on 5/2022.  CT imaging from 5/2022 without concerning findings of the kidney, ureter, urinary bladder.  Today she denies significant lower urinary tract symptoms.  She denies history of hematuria.  She denies past urologic evaluation including instrumentation or procedure.  She reports reduced water intake, mild irritative fluid intake.  She reports history of constipation.    We also discussed in depth the etiology and management of urinary tract infection and recurrent urinary tract infection.  We discussed the important principal that the definition of a diagnosed UTI requires a urine culture.  We discussed that a presumptive diagnosis of UTI cannot be made with just signs symptoms or urinalysis.  We discussed that urinalysis is not sufficient to diagnose a UTI.  We discussed the difference between unresolved versus recurrent urinary tract infection.  We discussed that recurrent  UTIs typically defined as greater than 2 UTIs within 6 months or greater than 3 within 1 year.  Discussed that the evaluation for recurrent UTI should include a history physical exam and a urine culture.  We discussed the recurrent infections can be a single complicated UTI versus a reinfection.  We discussed the indication for evaluation of the urinary tract when recurrent infections are complicated, occur frequently, or and symptoms persist long after an infection is eradicated.  We discussed therapies to prevent recurrence of infections.  Discussed conservative therapies including increased hydration appropriate , urinary hygiene, decreasing constipation, improvement in pelvic floor related symptoms, decreasing irritative food/fluid intake.  We discussed topical vaginal estrogen and the role this plays in decreasing the rate of infection.  We discussed that topical vaginal estrogen decreases vaginal pH, normalizes the vaginal gwyn, and decreases contamination.  We discussed the role  of prophylactic anti-biotic and self start anti-biotic.  We discussed mechanisms to prevent infection including oral cranberry supplement, d-mannose, methenamine.  We discussed the role for laboratory testing when on this therapy.   We discussed that we will unlikely be able to eradicate all infection but our goal is to decrease the number she has per year.  Patient is understanding and agreeable with the plan of care above to decrease the rate of infections.    She will continue with conservative based therapies as instructed.  She will work to increase fluid intake, decrease caffeinated and irritative beverages.  She will work on a bowel regimen daily.  We have instructed her to trial cranberry tablet supplement and d-mannose.  She may follow-up with her primary care physician, urology with any change or worsening in symptoms.    Diagnoses and all orders for this visit:    1. Lower urinary tract symptoms (LUTS) (Primary)    2.  Urinary tract infection without hematuria, site unspecified        Follow Up:   Return if symptoms worsen or fail to improve.    I spent approximately 45 minutes providing clinical care for this patient; including review of patient's chart and provider documentation, face to face time spent with patient in examination room (obtaining history, performing physical exam, discussing diagnosis and management options), placing orders, and completing patient documentation.     Mike Floyd MD  [facility] [place]

## 2022-07-07 PROBLEM — N39.0 URINARY TRACT INFECTION WITHOUT HEMATURIA: Status: ACTIVE | Noted: 2022-07-07

## 2022-07-07 PROBLEM — R39.9 LOWER URINARY TRACT SYMPTOMS (LUTS): Status: ACTIVE | Noted: 2022-07-07

## 2022-07-12 PROBLEM — Z00.00 HEALTHCARE MAINTENANCE: Status: ACTIVE | Noted: 2022-07-12

## 2022-07-14 ENCOUNTER — TELEPHONE (OUTPATIENT)
Dept: INTERNAL MEDICINE | Facility: CLINIC | Age: 77
End: 2022-07-14

## 2022-07-14 NOTE — TELEPHONE ENCOUNTER
Sherley with [employer] called and requested an RX for durable medical equipment.     The RX must have the following on it:  2 Mastectomy bras  1 leisure breast form  ICD 10 Code C50.911    Also needs  Note stating patient has had a mastectomy.        Fax [phone]

## 2022-07-15 DIAGNOSIS — C50.911 MALIGNANT NEOPLASM OF RIGHT FEMALE BREAST, UNSPECIFIED ESTROGEN RECEPTOR STATUS, UNSPECIFIED SITE OF BREAST: Primary | ICD-10-CM

## 2022-08-30 ENCOUNTER — LAB (OUTPATIENT)
Dept: INTERNAL MEDICINE | Facility: CLINIC | Age: 77
End: 2022-08-30

## 2022-08-30 ENCOUNTER — LAB (OUTPATIENT)
Dept: LAB | Facility: HOSPITAL | Age: 77
End: 2022-08-30

## 2022-08-30 ENCOUNTER — TELEPHONE (OUTPATIENT)
Dept: ORTHOPEDIC SURGERY | Facility: CLINIC | Age: 77
End: 2022-08-30

## 2022-08-30 DIAGNOSIS — E78.5 HYPERLIPIDEMIA, UNSPECIFIED HYPERLIPIDEMIA TYPE: ICD-10-CM

## 2022-08-30 LAB
CHOLEST SERPL-MCNC: 151 MG/DL (ref 0–200)
HDLC SERPL-MCNC: 47 MG/DL (ref 40–60)
LDLC SERPL CALC-MCNC: 84 MG/DL (ref 0–100)
LDLC/HDLC SERPL: 1.74 {RATIO}
TRIGL SERPL-MCNC: 111 MG/DL (ref 0–150)
VLDLC SERPL-MCNC: 20 MG/DL (ref 5–40)

## 2022-08-30 PROCEDURE — 80061 LIPID PANEL: CPT

## 2022-08-30 NOTE — TELEPHONE ENCOUNTER
Caller: NATALIO     Relationship to patient: SELF    Best call back number: [phone]    Chief complaint: R KNEE     Type of visit: INJECTION     Requested date: NEXT AVAIL , ASAP     Additional notes: PT STATES SHE WAS IN TO SEE DR ROBINS IN January 2021 AND MENTIONED SHE WAS HAVING KNEE PAIN AND THEY GAVE HER AN INJECTION IN THAT KNEE , PLEASE ADVISE. PT IS REQUESTING ANOTHER INJECTION

## 2022-09-13 PROBLEM — C44.92 SQUAMOUS CELL SKIN CANCER: Status: ACTIVE | Noted: 2022-09-13

## 2022-09-22 ENCOUNTER — OFFICE VISIT (OUTPATIENT)
Dept: ORTHOPEDIC SURGERY | Facility: CLINIC | Age: 77
End: 2022-09-22

## 2022-09-22 VITALS
HEIGHT: 64 IN | WEIGHT: 148 LBS | DIASTOLIC BLOOD PRESSURE: 76 MMHG | SYSTOLIC BLOOD PRESSURE: 120 MMHG | BODY MASS INDEX: 25.27 KG/M2

## 2022-09-22 DIAGNOSIS — M25.562 PAIN IN BOTH KNEES, UNSPECIFIED CHRONICITY: Primary | ICD-10-CM

## 2022-09-22 DIAGNOSIS — M25.561 PAIN IN BOTH KNEES, UNSPECIFIED CHRONICITY: Primary | ICD-10-CM

## 2022-09-22 DIAGNOSIS — M17.0 PRIMARY OSTEOARTHRITIS OF BOTH KNEES: ICD-10-CM

## 2022-09-22 DIAGNOSIS — M11.20 CHONDROCALCINOSIS: ICD-10-CM

## 2022-09-22 PROCEDURE — 20610 DRAIN/INJ JOINT/BURSA W/O US: CPT | Performed by: PHYSICIAN ASSISTANT

## 2022-09-22 PROCEDURE — 99214 OFFICE O/P EST MOD 30 MIN: CPT | Performed by: PHYSICIAN ASSISTANT

## 2022-09-22 RX ADMIN — LIDOCAINE HYDROCHLORIDE 4 ML: 10 INJECTION, SOLUTION EPIDURAL; INFILTRATION; INTRACAUDAL; PERINEURAL at 15:40

## 2022-09-22 RX ADMIN — TRIAMCINOLONE ACETONIDE 40 MG: 40 INJECTION, SUSPENSION INTRA-ARTICULAR; INTRAMUSCULAR at 15:40

## 2022-09-22 NOTE — PROGRESS NOTES
[facility] Note        Subjective     Pain of the Right Knee and Pain of the Left Knee      HPI    Christina Guillen is a 77 y.o. female. Patient returns today for her right knee with new left knee pain.  No trauma or injury.  She reports pain has been increasing since January 2021.  I have seen her in the past for the right knee, no left knee treatment.  She complains of stiffness and pain and swelling and aching that is 3-4/10.  She received a right knee cortisone injection most recently in January 2022 with Dr. Tom.  She denies any mechanical symptoms or radiating pain.  Here for further evaluation    Past Medical History:   Diagnosis Date   • Breast cancer (HCC) 1975    right    • Cancer (HCC)    • Disease of thyroid gland    • Fracture of ankle Nov 2020   • Hypertension    • Knee swelling Jan 2021   • Urinary tract infection       Past Surgical History:   Procedure Laterality Date   • BREAST BIOPSY Right 1975   • MASTECTOMY Right 1975      Family History   Problem Relation Age of Onset   • Heart valve disorder Mother    • Kidney failure Mother    • Breast cancer Neg Hx    • Ovarian cancer Neg Hx      Social History     Socioeconomic History   • Marital status:    Tobacco Use   • Smoking status: Never Smoker   • Smokeless tobacco: Never Used   Substance and Sexual Activity   • Alcohol use: Never   • Drug use: Never   • Sexual activity: Not Currently     Partners: Male      Current Outpatient Medications on File Prior to Visit   Medication Sig Dispense Refill   • amLODIPine (NORVASC) 5 MG tablet Take 5 mg by mouth Daily.     • aspirin 81 MG EC tablet Take 81 mg by mouth Daily.     • atenolol (TENORMIN) 50 MG tablet Take 50 mg by mouth Daily.     • levothyroxine (SYNTHROID, LEVOTHROID) 50 MCG tablet Take 50 mcg by mouth Daily.     • pravastatin (PRAVACHOL) 20 MG tablet Take 20 mg by mouth Daily.       No current facility-administered medications on file prior to visit.     "  Allergies   Allergen Reactions   • Levofloxacin Hives   • Erythromycin Rash   • Macrobid [Nitrofurantoin] Nausea Only   • Penicillins Rash   • Septra [Sulfamethoxazole-Trimethoprim] Rash          Review of Systems   Constitutional: Negative.    HENT: Negative.    Eyes: Negative.    Respiratory: Negative.    Cardiovascular: Negative.    Gastrointestinal: Negative.    Endocrine: Negative.    Genitourinary: Negative.    Musculoskeletal: Positive for arthralgias.   Skin: Negative.    Allergic/Immunologic: Negative.    Neurological: Negative.    Hematological: Negative.    Psychiatric/Behavioral: Negative.         I reviewed the patient's chief complaint, history of present illness, review of systems, past medical history, surgical history, family history, social history, medications and allergy list.        Objective      Physical Exam  /76   Ht 161.3 cm (63.5\")   Wt 67.1 kg (148 lb)   BMI 25.80 kg/m²     Body mass index is 25.8 kg/m².    General  Mental Status - alert  General Appearance - cooperative, well groomed, not in acute distress  Orientation - Oriented X3  Build & Nutrition - well developed and well nourished  Posture - normal posture  Gait -mildly antalgic       Ortho Exam  Bilateral knee exam: Tender over the medial joint line.  Tenderness over the lateral joint line on the right.  Range of motion 0-1 20 ligament stable valgus varus stress neurovascular intact distally.    Assessment    Assessment:  1. Pain in both knees, unspecified chronicity    2. Primary osteoarthritis of both knees    3. Chondrocalcinosis          Plan:  1. Recommend over-the-counter medication as needed for discomfort  2. Bilateral knee arthritis with chondrocalcinosis.  I reviewed today's x-rays clinical findings past and current treatment the patient.  She has near bone-on-bone contact in the medial compartment with calcification in the medial and lateral menisci on the right and medial on the left.  Her pain and function " limitations are secondary to arthritis.  We discussed treatment options including good shoe wear, ice, NSAIDs, low impact exercise, knee/hip strengthening, weight loss and the 4 fold multiplier on the joint, steroid injection, viscosupplementation and eventually knee replacement.  Plan today is cortisone injection to the right knee.  She does not feel the left knee is painful enough to require injection.  I will place an order for [employer] and see if this is approved by her insurance.    I discussed with the patient the potential benefits of performing a therapeutic injection of the right knee as well as potential risks including but not limited to infection, swelling, pain, bleeding, bruising, nerve/vessel damage, skin color changes, transient elevation in blood glucose levels, and fat atrophy. After informed consent and verifying correct patient, procedure site, and type of procedure, the area was prepped with Hibiclens, ethyl chloride was used to numb the skin. Via the inferior lateral approach, 4cc of 1% lidocaine and  40mg/ml of Kenalog were injected into the right knee. The patient tolerated the procedure well. There were no complications.           Kathleen Mays PA-C  09/27/22  06:06 EDT

## 2022-09-22 NOTE — PROGRESS NOTES
Procedure   Large Joint Arthrocentesis: R knee  Date/Time: 9/22/2022 3:40 PM  Consent given by: patient  Site marked: site marked  Timeout: Immediately prior to procedure a time out was called to verify the correct patient, procedure, equipment, support staff and site/side marked as required   Supporting Documentation  Indications: pain   Procedure Details  Location: knee - R knee  Preparation: Patient was prepped and draped in the usual sterile fashion  Needle size: 22 G  Approach: anterolateral  Medications administered: 4 mL lidocaine PF 1% 1 %; 40 mg triamcinolone acetonide 40 MG/ML  Patient tolerance: patient tolerated the procedure well with no immediate complications

## 2022-09-27 RX ORDER — TRIAMCINOLONE ACETONIDE 40 MG/ML
40 INJECTION, SUSPENSION INTRA-ARTICULAR; INTRAMUSCULAR
Status: COMPLETED | OUTPATIENT
Start: 2022-09-22 | End: 2022-09-22

## 2022-09-27 RX ORDER — LIDOCAINE HYDROCHLORIDE 10 MG/ML
4 INJECTION, SOLUTION EPIDURAL; INFILTRATION; INTRACAUDAL; PERINEURAL
Status: COMPLETED | OUTPATIENT
Start: 2022-09-22 | End: 2022-09-22

## 2022-12-14 ENCOUNTER — TELEPHONE (OUTPATIENT)
Dept: ORTHOPEDIC SURGERY | Facility: CLINIC | Age: 77
End: 2022-12-14

## 2022-12-20 ENCOUNTER — TELEPHONE (OUTPATIENT)
Dept: ORTHOPEDIC SURGERY | Facility: CLINIC | Age: 77
End: 2022-12-20

## 2022-12-20 NOTE — TELEPHONE ENCOUNTER
PATIENT CALLED AND IS REPORTING SWELLING IN HER LEFT LEG SINCE LAST NIGHT. SHE WANTED TO KNOW WHAT SHE COULD DO TO MINIMIZE THIS. YOU CAN CALL HER [phone]

## 2022-12-20 NOTE — TELEPHONE ENCOUNTER
I spoke with the patient to see where the swelling was she stated it is around her knee. The side of the knee is very tender started about 3:30pm 12/19/22 the knee was swollen twice the size when she went to bed. Today the swelling has went down a lot since yesterday. I advised her to take anti inflammatories, ice and elevate the knee if she has anymore problems or if that doesn't work then give us a call back. The patient verbalized understanding.     Alona Crabtree

## 2022-12-27 ENCOUNTER — TELEPHONE (OUTPATIENT)
Dept: ORTHOPEDIC SURGERY | Facility: CLINIC | Age: 77
End: 2022-12-27

## 2023-01-12 ENCOUNTER — TELEPHONE (OUTPATIENT)
Dept: ORTHOPEDIC SURGERY | Facility: CLINIC | Age: 78
End: 2023-01-12
Payer: MEDICARE

## 2023-01-16 ENCOUNTER — OFFICE VISIT (OUTPATIENT)
Dept: INTERNAL MEDICINE | Facility: CLINIC | Age: 78
End: 2023-01-16
Payer: MEDICARE

## 2023-01-16 VITALS
WEIGHT: 152 LBS | OXYGEN SATURATION: 98 % | TEMPERATURE: 97.7 F | HEIGHT: 64 IN | DIASTOLIC BLOOD PRESSURE: 58 MMHG | SYSTOLIC BLOOD PRESSURE: 118 MMHG | RESPIRATION RATE: 18 BRPM | BODY MASS INDEX: 25.95 KG/M2 | HEART RATE: 60 BPM

## 2023-01-16 DIAGNOSIS — E66.3 OVERWEIGHT (BMI 25.0-29.9): ICD-10-CM

## 2023-01-16 DIAGNOSIS — J06.9 UPPER RESPIRATORY TRACT INFECTION, UNSPECIFIED TYPE: Primary | ICD-10-CM

## 2023-01-16 PROCEDURE — 99213 OFFICE O/P EST LOW 20 MIN: CPT | Performed by: NURSE PRACTITIONER

## 2023-01-16 RX ORDER — DOXYCYCLINE HYCLATE 100 MG/1
100 CAPSULE ORAL 2 TIMES DAILY
Qty: 20 CAPSULE | Refills: 0 | OUTPATIENT
Start: 2023-01-16 | End: 2023-03-10

## 2023-01-16 NOTE — PROGRESS NOTES
"Chief Complaint  Cough (Productive cough, congestion, x4d)    Subjective          Christina Guillen presents to [facility]  History of Present Illness  The patient presents today for a acute visit for a cough. She was last seen in 05/2022. She is accompanied by an adult male today.    The patient reports chest congestion and a cough since 01/12/2023. She denies being tested for COVID-19 and has not been in contact with anyone else who is ill. She reports swollen glands and soreness in her right ear when she lays on it. She reports a productive cough with unknown colored sputum. She denies any shortness of breath, fever or chest pain. She has been taking mucus relief medication, which she believes is helping.    The patient's last creatinine level was 1.16 and GFR was 48.    She is taking pravastatin for hyperlipidemia and Atenolol and Norvasc for hypertension.     The patient is allergic to ERYTHROMYCIN.      Current Outpatient Medications:   •  amLODIPine (NORVASC) 5 MG tablet, Take 5 mg by mouth Daily., Disp: , Rfl:   •  aspirin 81 MG EC tablet, Take 81 mg by mouth Daily., Disp: , Rfl:   •  atenolol (TENORMIN) 50 MG tablet, Take 50 mg by mouth Daily., Disp: , Rfl:   •  Cranberry 125 MG tablet, Take  by mouth., Disp: , Rfl:   •  levothyroxine (SYNTHROID, LEVOTHROID) 50 MCG tablet, Take 50 mcg by mouth Daily., Disp: , Rfl:   •  pravastatin (PRAVACHOL) 20 MG tablet, Take 20 mg by mouth Daily., Disp: , Rfl:   •  benzonatate (TESSALON) 200 MG capsule, Take 1 capsule by mouth 3 (Three) Times a Day As Needed for Cough., Disp: 30 capsule, Rfl: 0  •  doxycycline (VIBRAMYCIN) 100 MG capsule, Take 1 capsule by mouth 2 (Two) Times a Day., Disp: 20 capsule, Rfl: 0         Objective   Vital Signs:   /58   Pulse 60   Temp 97.7 °F (36.5 °C) (Infrared)   Resp 18   Ht 161.3 cm (63.5\")   Wt 68.9 kg (152 lb)   SpO2 98%   BMI 26.50 kg/m²     Physical Exam  Vitals and nursing " note reviewed.   Constitutional:       Appearance: She is well-developed.   HENT:      Head: Normocephalic and atraumatic.      Right Ear: External ear normal.      Left Ear: External ear normal.      Nose: Nose normal.      Mouth/Throat:      Comments: Clear postnasal drainage present.  Eyes:      General:         Right eye: No discharge.         Left eye: No discharge.      Conjunctiva/sclera: Conjunctivae normal.   Cardiovascular:      Rate and Rhythm: Normal rate and regular rhythm.   Pulmonary:      Effort: Pulmonary effort is normal.      Breath sounds: Normal breath sounds.   Abdominal:      General: Bowel sounds are normal. There is no distension.      Palpations: Abdomen is soft.      Tenderness: There is no abdominal tenderness.   Lymphadenopathy:      Cervical: No cervical adenopathy.   Skin:     General: Skin is warm and dry.      Capillary Refill: Capillary refill takes 2 to 3 seconds.   Neurological:      Mental Status: She is alert and oriented to person, place, and time.   Psychiatric:         Behavior: Behavior normal.        Result Review :                 Assessment and Plan    Diagnoses and all orders for this visit:    1. Upper respiratory tract infection, unspecified type (Primary)  -     Cancel: Covid-19 + Flu A&B AG, Veritor; Future    2. Overweight (BMI 25.0-29.9)    Other orders  -     doxycycline (VIBRAMYCIN) 100 MG capsule; Take 1 capsule by mouth 2 (Two) Times a Day.  Dispense: 20 capsule; Refill: 0    Upper respiratory tract infection.  - Treat with a course of doxycycline for bronchitis and will see her back if symptoms do not improve.                 Follow Up   Return in about 3 months (around 4/16/2023) for Medicare Wellness, Annual, fasting.  Patient was given instructions and counseling regarding her condition or for health maintenance advice. Please see specific information pulled into the AVS if appropriate.     RTC/call  If symptoms worsen  Meds MOA and SE's reviewed and pt  v/u    RUBINA Jarquin [facility]  [address]  [facility] [address]  Fax [phone]  Phone [phone]    Transcribed from ambient dictation for RUBINA Jarquin by Carla Bryan.  01/16/23   12:03 EST    Patient or patient representative verbalized consent to the visit recording.  I have personally performed the services described in this document as transcribed by the above individual, and it is both accurate and complete.

## 2023-01-16 NOTE — PATIENT INSTRUCTIONS
MyPlate from USDA  MyPlate is an outline of a general healthy diet based on the Dietary Guidelines for Americans, 1825-3593, from the U.S. Department of Agriculture (USDA). It sets guidelines for how much food you should eat from each food group based on your age, sex, and level of physical activity.  What are tips for following MyPlate?  To follow MyPlate recommendations:  Eat a wide variety of fruits and vegetables, grains, and protein foods.  Serve smaller portions and eat less food throughout the day.  Limit portion sizes to avoid overeating.  Enjoy your food.  Get at least 150 minutes of exercise every week. This is about 30 minutes each day, 5 or more days per week.  It can be difficult to have every meal look like MyPlate. Think about MyPlate as eating guidelines for an entire day, rather than each individual meal.  Fruits and vegetables  Make one half of your plate fruits and vegetables.  Eat many different colors of fruits and vegetables each day.  For a 2,000-calorie daily food plan, eat:  2½ cups of vegetables every day.  2 cups of fruit every day.  1 cup is equal to:  1 cup raw or cooked vegetables.  1 cup raw fruit.  1 medium-sized orange, apple, or banana.  1 cup 100% fruit or vegetable juice.  2 cups raw leafy greens, such as lettuce, spinach, or kale.  ½ cup dried fruit.  Grains  One fourth of your plate should be grains.  Make at least half of the grains you eat each day whole grains.  For a 2,000-calorie daily food plan, eat 6 oz of grains every day.  1 oz is equal to:  1 slice bread.  1 cup cereal.  ½ cup cooked rice, cereal, or pasta.  Protein  One fourth of your plate should be protein.  Eat a wide variety of protein foods, including meat, poultry, fish, eggs, beans, nuts, and tofu.  For a 2,000-calorie daily food plan, eat 5½ oz of protein every day.  1 oz is equal to:  1 oz meat, poultry, or fish.  ¼ cup cooked beans.  1 egg.  ½ oz nuts or seeds.  1 Tbsp peanut butter.  Dairy  Drink fat-free  or low-fat (1%) milk.  Eat or drink dairy as a side to meals.  For a 2,000-calorie daily food plan, eat or drink 3 cups of dairy every day.  1 cup is equal to:  1 cup milk, yogurt, cottage cheese, or soy milk (soy beverage).  2 oz processed cheese.  1½ oz natural cheese.  Fats, oils, salt, and sugars  Only small amounts of oils are recommended.  Avoid foods that are high in calories and low in nutritional value (empty calories), like foods high in fat or added sugars.  Choose foods that are low in salt (sodium). Choose foods that have less than 140 milligrams (mg) of sodium per serving.  Drink water instead of sugary drinks. Drink enough fluid to keep your urine pale yellow.  Where to find support  Work with your health care provider or a dietitian to develop a customized eating plan that is right for you.  Download an nikole (mobile application) to help you track your daily food intake.  Where to find more information  USDA: ChooseMyPlate.gov  Summary  MyPlate is a general guideline for healthy eating from the USDA. It is based on the Dietary Guidelines for Americans, 7775-5240.  In general, fruits and vegetables should take up one half of your plate, grains should take up one fourth of your plate, and protein should take up one fourth of your plate.  This information is not intended to replace advice given to you by your health care provider. Make sure you discuss any questions you have with your health care provider.  Document Revised: 11/08/2021 Document Reviewed: 11/08/2021  [employer] Patient Education © 2022 [employer].

## 2023-01-17 ENCOUNTER — TELEPHONE (OUTPATIENT)
Dept: INTERNAL MEDICINE | Facility: CLINIC | Age: 78
End: 2023-01-17
Payer: MEDICARE

## 2023-01-17 RX ORDER — BENZONATATE 200 MG/1
200 CAPSULE ORAL 3 TIMES DAILY PRN
Qty: 30 CAPSULE | Refills: 0 | OUTPATIENT
Start: 2023-01-17 | End: 2023-03-10

## 2023-01-17 NOTE — TELEPHONE ENCOUNTER
Caller: Hima Guillen    Relationship: Emergency Contact    Best call back number: [phone]    What medication are you requesting: TESLON AND PEARLS FOR COUGH    What are your current symptoms: COUGH    How long have you been experiencing symptoms: WEEK    Have you had these symptoms before:    [x] Yes  [] No    Have you been treated for these symptoms before:   [x] Yes  [] No    If a prescription is needed, what is your preferred pharmacy and phone number: [address] [phone] [facility] [phone]      Additional notes: PATIENT WAS SEEN YESTERDAY

## 2023-02-23 ENCOUNTER — TELEPHONE (OUTPATIENT)
Dept: INTERNAL MEDICINE | Facility: CLINIC | Age: 78
End: 2023-02-23

## 2023-02-23 NOTE — TELEPHONE ENCOUNTER
Caller: Mindy Christina P    Relationship: Self    Best call back number: [phone]    Requested Prescriptions:   Requested Prescriptions     Pending Prescriptions Disp Refills   • atenolol (TENORMIN) 50 MG tablet       Sig: Take 1 tablet by mouth Daily.   • pravastatin (PRAVACHOL) 20 MG tablet 90 tablet      Sig: Take 1 tablet by mouth Daily.   • amLODIPine (NORVASC) 5 MG tablet       Sig: Take 1 tablet by mouth Daily.        Pharmacy where request should be sent: [address] [phone] [facility] [phone] [facility]     Additional details provided by patient: PATIENT HAS ENOUGH TO LAST AT LEAST 3 DAYS    Does the patient have less than a 3 day supply:  [] Yes  [x] No    Would you like a call back once the refill request has been completed: [x] Yes [] No    If the office needs to give you a call back, can they leave a voicemail: [x] Yes [] No    Socorro Graham Rep   02/23/23 09:18 EST

## 2023-02-24 RX ORDER — ATENOLOL 50 MG/1
50 TABLET ORAL DAILY
Qty: 90 TABLET | Refills: 0 | Status: SHIPPED | OUTPATIENT
Start: 2023-02-24 | End: 2023-05-30 | Stop reason: SDUPTHER

## 2023-02-24 RX ORDER — AMLODIPINE BESYLATE 5 MG/1
5 TABLET ORAL DAILY
Qty: 90 TABLET | Refills: 0 | Status: SHIPPED | OUTPATIENT
Start: 2023-02-24 | End: 2023-05-30 | Stop reason: SDUPTHER

## 2023-02-24 RX ORDER — PRAVASTATIN SODIUM 20 MG
20 TABLET ORAL DAILY
Qty: 90 TABLET | Refills: 0 | Status: SHIPPED | OUTPATIENT
Start: 2023-02-24 | End: 2023-05-30 | Stop reason: SDUPTHER

## 2023-02-24 NOTE — TELEPHONE ENCOUNTER
Attempted to call patient . Was not able to leave a voice message .     HUB: please let patient know that her medication has been sent to the pharmacy.    please document when notified.

## 2023-03-13 ENCOUNTER — TELEPHONE (OUTPATIENT)
Dept: INTERNAL MEDICINE | Facility: CLINIC | Age: 78
End: 2023-03-13
Payer: MEDICARE

## 2023-03-13 NOTE — TELEPHONE ENCOUNTER
Called pt and explained that Macie is not here today. Pt stated the UC gave her a pain patch and it worked but then she removed it and her back burned all evening where the patch was. Pt stated no SOA, chest pain, nausea or dizziness. Advised we could wait till tomorrow, schedule with another provider but only for the back issue not to establish or visit an ED. Pt confirmed appt time for tomorrow.

## 2023-03-13 NOTE — TELEPHONE ENCOUNTER
Caller: Christina Guillen    Relationship to patient: Self    Best call back number: [phone]    Chief complaint: BACK PAIN    Type of visit: NEW PATIENT     Requested date: 03/13/23    If rescheduling, when is the original appointment: 03/14/23    Additional notes:PATIENT IS IN NEED OF A APPOINTMENT TODAY.PATIENT HAS HAD EXTREME BACK PAINS SINCE 03/09/23. PATIENT WENT TO THE  ON 03/10/23. PATIENT STILL IN EXTREME PAIN.    PLEASE CALL PATIENT IF YOU ARE ABLE TO SEE HER TODAY

## 2023-03-14 ENCOUNTER — OFFICE VISIT (OUTPATIENT)
Dept: INTERNAL MEDICINE | Facility: CLINIC | Age: 78
End: 2023-03-14
Payer: MEDICARE

## 2023-03-14 VITALS
BODY MASS INDEX: 25.27 KG/M2 | DIASTOLIC BLOOD PRESSURE: 62 MMHG | OXYGEN SATURATION: 96 % | TEMPERATURE: 97.2 F | HEIGHT: 64 IN | SYSTOLIC BLOOD PRESSURE: 112 MMHG | RESPIRATION RATE: 16 BRPM | WEIGHT: 148 LBS | HEART RATE: 72 BPM

## 2023-03-14 DIAGNOSIS — N28.9 KIDNEY DYSFUNCTION: ICD-10-CM

## 2023-03-14 DIAGNOSIS — Z00.00 HEALTHCARE MAINTENANCE: ICD-10-CM

## 2023-03-14 DIAGNOSIS — M19.90 ARTHRITIS: ICD-10-CM

## 2023-03-14 DIAGNOSIS — I49.9 IRREGULAR HEART BEAT: Primary | ICD-10-CM

## 2023-03-14 DIAGNOSIS — E78.5 HYPERLIPIDEMIA, UNSPECIFIED HYPERLIPIDEMIA TYPE: ICD-10-CM

## 2023-03-14 DIAGNOSIS — Z76.89 ENCOUNTER TO ESTABLISH CARE: ICD-10-CM

## 2023-03-14 DIAGNOSIS — B02.9 HERPES ZOSTER WITHOUT COMPLICATION: ICD-10-CM

## 2023-03-14 DIAGNOSIS — R93.5 ABNORMAL FINDINGS ON DIAGNOSTIC IMAGING OF ABDOMEN: ICD-10-CM

## 2023-03-14 DIAGNOSIS — I10 HYPERTENSION, UNSPECIFIED TYPE: ICD-10-CM

## 2023-03-14 DIAGNOSIS — M54.9 ACUTE BACK PAIN, UNSPECIFIED BACK LOCATION, UNSPECIFIED BACK PAIN LATERALITY: ICD-10-CM

## 2023-03-14 DIAGNOSIS — Z11.59 NEED FOR HEPATITIS C SCREENING TEST: ICD-10-CM

## 2023-03-14 DIAGNOSIS — E03.9 HYPOTHYROIDISM, UNSPECIFIED TYPE: ICD-10-CM

## 2023-03-14 PROCEDURE — 1160F RVW MEDS BY RX/DR IN RCRD: CPT | Performed by: NURSE PRACTITIONER

## 2023-03-14 PROCEDURE — 1159F MED LIST DOCD IN RCRD: CPT | Performed by: NURSE PRACTITIONER

## 2023-03-14 PROCEDURE — 93000 ELECTROCARDIOGRAM COMPLETE: CPT | Performed by: NURSE PRACTITIONER

## 2023-03-14 PROCEDURE — 99214 OFFICE O/P EST MOD 30 MIN: CPT | Performed by: NURSE PRACTITIONER

## 2023-03-14 RX ORDER — VALACYCLOVIR HYDROCHLORIDE 1 G/1
1000 TABLET, FILM COATED ORAL 3 TIMES DAILY
Qty: 21 TABLET | Refills: 0 | Status: SHIPPED | OUTPATIENT
Start: 2023-03-14 | End: 2023-03-21

## 2023-03-14 NOTE — PROGRESS NOTES
New Patient Office Visit      Date: 2023   Patient Name: Christina Guillen  : 1945   MRN: [id number]     Chief Complaint:    Chief Complaint   Patient presents with   • Shoulder Pain   • Establish Care     Pt states back pain for about a week, right side, radiates down through arm   • Arm Pain       History of Present Illness: Christina Guillen is a 77 y.o. female who is here today to establish care. Previous PCP: RUBINA Otero. Requests to transfer primary medical care for both herself and her  to [place] office as this is more convenient location compared to where they live. She is accompanied by her  who contributes to her history. She does not need any medication refills at this time. Chronic conditions are all well controlled and include hypothyroidism, HTN, HLD, frequent UTI (Saw Dr Floyd, urology in ),and hearing loss (has seen Dr Monk, ENT in [place] previously).    C/o acute back pain located at right scapula, right arm numbness, and rash Back pain: right scapula, right arm numbness, and rash.    Right sided back pain  Patient's pain started in her right shoulder blade on 2023.   Notes pain is constant and radiates down her right arm along with numbness and tingling.   Also has pain on the right side of her neck.   Denies recent trauma, but does endorse possible stain from increased physical exertion compared to what she is used to. Reports recently picking pine cones in her yard, taking down [place] decorations, and putting up Easter decorations on 2023.   Previously experienced pain with palpation which is improved today. Was seen at  on 3/10 with XR imaging of bilateral knees, R shoulder, and Tspine. She was given Rx for lidocaine patch.   Patient tried a lidocaine patch that she wore for 8 hours, but removed due to burning sensation on her back   She also tried topical diclofenac and Icy Hot with lidocaine and menthol.   Reports having a back massage and  "was told that they could feel a bunch of knots in her back.  Pt requests referral to PT, at [facility] in [facility]    Rash  Reports a rash on her chest that erupted on 03/12/2023 or 03/13/2023.   She had 1 shingles vaccine many years ago but cannot recall specific date of administration. Never got second vaccine.     Health maintenance   She is due for her annual wellness exam.   Denies following any specialists.   Last lipid panel in 08/2022 was normal.   Reports abnormal kidney function for the last 5 years.   Patient was evaluated in the ED on Mother's day 2022 for evaluation of rectal bleeding, and was dx with hemorrhoids.    Notes having hemorrhoids for years, but her bleeding was not related to the hemorrhoids.   Reports wearing a Holter monitor in 2012. Findings demonstrated her heart was skipping \"approximately 8000\" beats per day. She followed with Dr. Trevizo after wearing her Holter monitor.   Patient has been told that her heart beat was regular after that time.       Subjective      Review of Systems:   Review of Systems   Constitutional: Negative for chills, fatigue and fever.   HENT: Negative for postnasal drip.    Respiratory: Negative for cough, shortness of breath and wheezing.    Cardiovascular: Negative for chest pain.   Gastrointestinal: Negative for abdominal pain, diarrhea, nausea and vomiting.   Endocrine: Negative for cold intolerance and heat intolerance.   Genitourinary: Negative for dysuria.   Musculoskeletal: Positive for back pain. Negative for arthralgias and myalgias.        Right sided back, shoulder and neck pain.    Skin: Positive for rash. Negative for bruise.        Looks like shingles on back.   Neurological: Positive for numbness. Negative for headache.        Right arm numbness.    Hematological: Negative for adenopathy. Does not bruise/bleed easily.   Psychiatric/Behavioral: Negative for depressed mood.       Past Medical History:   Past Medical History: "   Diagnosis Date   • Arthritis 2020   • Breast cancer (HCC) 1975    right    • Cancer (HCC)    • Cataract 2005   • Disease of thyroid gland    • Fracture of ankle Nov 2020   • Hyperlipidemia ??   • Hypertension    • Hypothyroidism ??   • Knee swelling Jan 2021   • Urinary tract infection        Past Surgical History:   Past Surgical History:   Procedure Laterality Date   • BREAST BIOPSY Right 1975   • COLONOSCOPY  2022    No polyps   • EYE SURGERY  2013    Detached retina   • MASTECTOMY Right 1975       Family History:   Family History   Problem Relation Age of Onset   • Heart valve disorder Mother    • Kidney failure Mother    • Heart disease Mother    • Breast cancer Neg Hx    • Ovarian cancer Neg Hx        Social History:   Social History     Socioeconomic History   • Marital status:    Tobacco Use   • Smoking status: Never   • Smokeless tobacco: Never   Vaping Use   • Vaping Use: Never used   Substance and Sexual Activity   • Alcohol use: Never   • Drug use: Never   • Sexual activity: Not Currently     Partners: Male       Medications:     Current Outpatient Medications:   •  amLODIPine (NORVASC) 5 MG tablet, Take 1 tablet by mouth Daily., Disp: 90 tablet, Rfl: 0  •  aspirin 81 MG EC tablet, Take 1 tablet by mouth Daily., Disp: , Rfl:   •  atenolol (TENORMIN) 50 MG tablet, Take 1 tablet by mouth Daily., Disp: 90 tablet, Rfl: 0  •  Cranberry 125 MG tablet, Take  by mouth., Disp: , Rfl:   •  levothyroxine (SYNTHROID, LEVOTHROID) 50 MCG tablet, Take 1 tablet by mouth Daily., Disp: , Rfl:   •  pravastatin (PRAVACHOL) 20 MG tablet, Take 1 tablet by mouth Daily., Disp: 90 tablet, Rfl: 0  •  valACYclovir (Valtrex) 1000 MG tablet, Take 1 tablet by mouth 3 (Three) Times a Day for 7 days., Disp: 21 tablet, Rfl: 0    Allergies:   Allergies   Allergen Reactions   • Levofloxacin Hives   • Erythromycin Rash   • Macrobid [Nitrofurantoin] Nausea Only   • Penicillins Rash   • Septra [Sulfamethoxazole-Trimethoprim] Rash  "      Objective     Physical Exam:  Vital Signs:   Vitals:    03/14/23 1024   BP: 112/62   Pulse: 72   Resp: 16   Temp: 97.2 °F (36.2 °C)   SpO2: 96%   Weight: 67.1 kg (148 lb)   Height: 162.6 cm (64\")   PainSc:   5     Body mass index is 25.4 kg/m².  BMI is >= 25 and <30. (Overweight) The following options were offered after discussion;: exercise counseling/recommendations and nutrition counseling/recommendations       Physical Exam  Vitals and nursing note reviewed.   Constitutional:       General: She is not in acute distress.     Appearance: Normal appearance. She is not ill-appearing.   HENT:      Head: Normocephalic and atraumatic.      Mouth/Throat:      Mouth: Mucous membranes are moist.      Pharynx: Oropharynx is clear. No oropharyngeal exudate or posterior oropharyngeal erythema.   Cardiovascular:      Rate and Rhythm: Normal rate. Rhythm irregularly irregular.      Heart sounds: S1 normal and S2 normal.   Pulmonary:      Effort: Pulmonary effort is normal. No respiratory distress.      Breath sounds: Normal breath sounds.   Chest:   Breasts:     Right: Absent.   Musculoskeletal:      Right shoulder: Normal.      Left shoulder: Normal.      Cervical back: Normal.      Thoracic back: Normal.      Lumbar back: Normal.   Skin:     General: Skin is warm and dry.      Findings: Rash (~5cm patch of skin erythema on right anterior chest with vesicular lesions. No drainage. ) present.      Comments: No skin irritation appreciated on her back from possible adverse rxn of lidocaine patch/ adhesive.    Neurological:      Mental Status: She is alert and oriented to person, place, and time.      Motor: No weakness.      Gait: Gait normal.           ECG 12 Lead    Date/Time: 3/14/2023 11:11 AM  Performed by: Macie Lynn APRN  Authorized by: Macie Lynn APRN   Comparison: not compared with previous ECG   Previous ECG: no previous ECG available  Rhythm: sinus bradycardia  BPM: " 53              Results:     Labs:   Latest Reference Range & Units 05/08/22 09:49   Glucose 65 - 99 mg/dL 105 (H)   Sodium 136 - 145 mmol/L 138   Potassium 3.5 - 5.2 mmol/L 4.3   CO2 22.0 - 29.0 mmol/L 25.0   Chloride 98 - 107 mmol/L 104   Anion Gap 5.0 - 15.0 mmol/L 9.0   Creatinine 0.57 - 1.00 mg/dL 1.16 (H)   BUN 8 - 23 mg/dL 21   BUN/Creatinine Ratio 7.0 - 25.0  18.1   Calcium 8.6 - 10.5 mg/dL 9.5   eGFR >60.0 mL/min/1.73 48.4 (L)   Alkaline Phosphatase 39 - 117 U/L 95   Total Protein 6.0 - 8.5 g/dL 7.2   ALT (SGPT) 1 - 33 U/L 11   AST (SGOT) 1 - 32 U/L 19   Total Bilirubin 0.0 - 1.2 mg/dL 0.3   Albumin 3.50 - 5.20 g/dL 4.30   Globulin gm/dL 2.9   A/G Ratio g/dL 1.5   WBC 3.40 - 10.80 10*3/mm3 8.36   RBC 3.77 - 5.28 10*6/mm3 3.59 (L)   Hemoglobin 12.0 - 15.9 g/dL 11.0 (L)   Hematocrit 34.0 - 46.6 % 32.8 (L)   RDW 12.3 - 15.4 % 13.5   MCV 79.0 - 97.0 fL 91.4   MCH 26.6 - 33.0 pg 30.6   MCHC 31.5 - 35.7 g/dL 33.5   MPV 6.0 - 12.0 fL 11.4   Platelets 140 - 450 10*3/mm3 241   RDW-SD 37.0 - 54.0 fl 45.5   Neutrophil Rel % 42.7 - 76.0 % 58.4   Lymphocyte Rel % 19.6 - 45.3 % 25.6   Monocyte Rel % 5.0 - 12.0 % 12.8 (H)   Eosinophil Rel % 0.3 - 6.2 % 2.2   Basophil Rel % 0.0 - 1.5 % 0.8   Immature Granulocyte Rel % 0.0 - 0.5 % 0.2   Neutrophils Absolute 1.70 - 7.00 10*3/mm3 4.88   Lymphocytes Absolute 0.70 - 3.10 10*3/mm3 2.14   Monocytes Absolute 0.10 - 0.90 10*3/mm3 1.07 (H)   Eosinophils Absolute 0.00 - 0.40 10*3/mm3 0.18   Basophils Absolute 0.00 - 0.20 10*3/mm3 0.07   Immature Grans, Absolute 0.00 - 0.05 10*3/mm3 0.02   nRBC 0.0 - 0.2 /100 WBC 0.0   (H): Data is abnormally high  (L): Data is abnormally low   Latest Reference Range & Units 08/30/22 09:09   Total Cholesterol 0 - 200 mg/dL 151   HDL Cholesterol 40 - 60 mg/dL 47   LDL Cholesterol  0 - 100 mg/dL 84   VLDL Cholesterol 5 - 40 mg/dL 20   Triglycerides 0 - 150 mg/dL 111   LDL/HDL Ratio  1.74     Imaging:  Xrays of tspine, right shoulder, and bilateral knees  performed 3/2023 and 9/2022.   Left diagnostic MMG performed 6/2022  CT abd/ pelvis 5/2022    Assessment / Plan      Assessment/Plan:   Diagnoses and all orders for this visit:     Irregular heart beat   -     ECG 12 Lead: sinus arthur  -will plan to review or request records from previous Holter monitor and Dr Trevizo last office note.    Acute back pain, unspecified back location, unspecified back pain laterality  Comments:  Advised to take Tylenol OTC prn for pain, but avoid NSAIDs given previous hx GI bleeding and known kidney dysfunction.   Ice/ heat therapy prn   Orders:  -     Cancel: Ambulatory Referral to Physical Therapy Evaluate and treat  -     Ambulatory Referral to Physical Therapy Evaluate and treat    Herpes zoster without complication  -     valACYclovir (Valtrex) 1000 MG tablet; Take 1 tablet by mouth 3 (Three) Times a Day for 7 days.  Dispense: 21 tablet; Refill: 0  -Shingles is spread by direct contact with the rash, so do not have massage or attend PT while rash is in blister phase. OK once vesicles are scabbed over. Also transmitted via breathing in virus particles that get mixed in the air  -OK to help  with dressing change, as long as you wash hands thoroughly before hand and wear gloves throughout.   -Will reassess post herpetic neuralgia sxs on return.   -See if record review or preferred pharmacy can confirm date of first immunization and whether she previously received zostavax or shingrix. (shingrix only started in 2017).  Pt must be fully recovered from acute infection before consideration of vaccine. Wait ~1m after full recovery. If we can confirm her first dose was shingrix, then we will give 2nd shot after recovery.       Hypothyroidism, unspecified type  -chronic, controlled   -continue levothyroxine 50mcg qd    Hypertension, unspecified type  -chronic, controlled  -continue atenolol 50mg qd and amlodipine 5mg qd    Hyperlipidemia, unspecified hyperlipidemia type  -chronic,  controlled  -continue pravastatin 20mg qd     Abnormal findings on diagnostic imaging of abdomen  -diverticulosis on CT abd/pelvis performed 5/2022    Arthritis  -noted on xrays   -acetaminophen, heat/ice therapy prn pain. Referral placed for PT.     Kidney dysfunction  -CMP 5/2022= creatinine: 1.16, GFR: 48.4  -avoid NSAIDs     Encounter to establish care  -plan for AWV on return with labs   -coordinate f/u apts with her husbands and schedule same day, back to back when possible     Healthcare maintenance  -bone density screen/DEXA: per note on 5-18-22 pt asked to sign release for medical records to obtain previous dexa scan results   -breast ca screen: Hx breast cancer and s/p right mastectomy. Left Dx MMG in 6/2022 birad1.   -depression screen: PHQ on return   -immunizations: overdue for covid and flu. See zoster note above.   -HCV: will order with upcoming labs   -colon ca screening:        Follow Up:   Return in about 4 weeks (around 4/11/2023) for Medicare Subsequent, labs with next visit.    RUBINA Manning  [facility] [place]    Transcribed from ambient dictation for RUBINA Huizar by Blair Melo.  03/14/23   12:49 EDT    Patient or patient representative verbalized consent to the visit recording.  I have personally performed the services described in this document as transcribed by the above individual, and it is both accurate and complete.

## 2023-03-17 ENCOUNTER — OFFICE VISIT (OUTPATIENT)
Dept: INTERNAL MEDICINE | Facility: CLINIC | Age: 78
End: 2023-03-17
Payer: MEDICARE

## 2023-03-17 ENCOUNTER — LAB (OUTPATIENT)
Dept: LAB | Facility: HOSPITAL | Age: 78
End: 2023-03-17
Payer: MEDICARE

## 2023-03-17 VITALS
HEIGHT: 64 IN | DIASTOLIC BLOOD PRESSURE: 72 MMHG | SYSTOLIC BLOOD PRESSURE: 124 MMHG | HEART RATE: 73 BPM | WEIGHT: 148 LBS | OXYGEN SATURATION: 97 % | BODY MASS INDEX: 25.27 KG/M2 | RESPIRATION RATE: 16 BRPM | TEMPERATURE: 96.2 F

## 2023-03-17 DIAGNOSIS — Z11.59 NEED FOR HEPATITIS C SCREENING TEST: ICD-10-CM

## 2023-03-17 DIAGNOSIS — R50.9 FEVER, UNSPECIFIED FEVER CAUSE: ICD-10-CM

## 2023-03-17 DIAGNOSIS — Z79.899 CONTROLLED SUBSTANCE AGREEMENT SIGNED: ICD-10-CM

## 2023-03-17 DIAGNOSIS — R50.9 FEVER, UNSPECIFIED FEVER CAUSE: Primary | ICD-10-CM

## 2023-03-17 DIAGNOSIS — Z79.899 ENCOUNTER FOR LONG-TERM (CURRENT) USE OF OTHER MEDICATIONS: ICD-10-CM

## 2023-03-17 DIAGNOSIS — N28.9 KIDNEY DYSFUNCTION: ICD-10-CM

## 2023-03-17 DIAGNOSIS — E78.5 HYPERLIPIDEMIA, UNSPECIFIED HYPERLIPIDEMIA TYPE: ICD-10-CM

## 2023-03-17 DIAGNOSIS — B02.8 HERPES ZOSTER WITH OTHER COMPLICATION: ICD-10-CM

## 2023-03-17 DIAGNOSIS — Z00.00 HEALTHCARE MAINTENANCE: ICD-10-CM

## 2023-03-17 DIAGNOSIS — I10 HYPERTENSION, UNSPECIFIED TYPE: ICD-10-CM

## 2023-03-17 DIAGNOSIS — B02.29 POST HERPETIC NEURALGIA: ICD-10-CM

## 2023-03-17 LAB
ALBUMIN SERPL-MCNC: 4.4 G/DL (ref 3.5–5.2)
ALBUMIN/GLOB SERPL: 1.5 G/DL
ALP SERPL-CCNC: 93 U/L (ref 39–117)
ALT SERPL W P-5'-P-CCNC: 13 U/L (ref 1–33)
ANION GAP SERPL CALCULATED.3IONS-SCNC: 11 MMOL/L (ref 5–15)
AST SERPL-CCNC: 21 U/L (ref 1–32)
BACTERIA UR QL AUTO: ABNORMAL /HPF
BILIRUB SERPL-MCNC: 0.3 MG/DL (ref 0–1.2)
BILIRUB UR QL STRIP: NEGATIVE
BUN SERPL-MCNC: 15 MG/DL (ref 8–23)
BUN/CREAT SERPL: 12.5 (ref 7–25)
CALCIUM SPEC-SCNC: 9.8 MG/DL (ref 8.6–10.5)
CHLORIDE SERPL-SCNC: 101 MMOL/L (ref 98–107)
CHOLEST SERPL-MCNC: 147 MG/DL (ref 0–200)
CLARITY UR: CLEAR
CO2 SERPL-SCNC: 25 MMOL/L (ref 22–29)
COLOR UR: YELLOW
CREAT SERPL-MCNC: 1.2 MG/DL (ref 0.57–1)
DEPRECATED RDW RBC AUTO: 41 FL (ref 37–54)
EGFRCR SERPLBLD CKD-EPI 2021: 46.4 ML/MIN/1.73
ERYTHROCYTE [DISTWIDTH] IN BLOOD BY AUTOMATED COUNT: 12.3 % (ref 12.3–15.4)
EXPIRATION DATE: NORMAL
FLUAV AG UPPER RESP QL IA.RAPID: NOT DETECTED
FLUBV AG UPPER RESP QL IA.RAPID: NOT DETECTED
GLOBULIN UR ELPH-MCNC: 3 GM/DL
GLUCOSE SERPL-MCNC: 95 MG/DL (ref 65–99)
GLUCOSE UR STRIP-MCNC: NEGATIVE MG/DL
HBA1C MFR BLD: 5.9 % (ref 4.8–5.6)
HCT VFR BLD AUTO: 36.9 % (ref 34–46.6)
HCV AB SER DONR QL: NORMAL
HDLC SERPL-MCNC: 46 MG/DL (ref 40–60)
HGB BLD-MCNC: 12 G/DL (ref 12–15.9)
HGB UR QL STRIP.AUTO: NEGATIVE
HYALINE CASTS UR QL AUTO: ABNORMAL /LPF
INTERNAL CONTROL: NORMAL
KETONES UR QL STRIP: NEGATIVE
LDLC SERPL CALC-MCNC: 75 MG/DL (ref 0–100)
LDLC/HDLC SERPL: 1.54 {RATIO}
LEUKOCYTE ESTERASE UR QL STRIP.AUTO: ABNORMAL
Lab: NORMAL
MCH RBC QN AUTO: 29.6 PG (ref 26.6–33)
MCHC RBC AUTO-ENTMCNC: 32.5 G/DL (ref 31.5–35.7)
MCV RBC AUTO: 91.1 FL (ref 79–97)
NITRITE UR QL STRIP: NEGATIVE
PH UR STRIP.AUTO: 5.5 [PH] (ref 5–8)
PLATELET # BLD AUTO: 238 10*3/MM3 (ref 140–450)
PMV BLD AUTO: 12.3 FL (ref 6–12)
POTASSIUM SERPL-SCNC: 4.4 MMOL/L (ref 3.5–5.2)
PROT SERPL-MCNC: 7.4 G/DL (ref 6–8.5)
PROT UR QL STRIP: NEGATIVE
RBC # BLD AUTO: 4.05 10*6/MM3 (ref 3.77–5.28)
RBC # UR STRIP: ABNORMAL /HPF
REF LAB TEST METHOD: ABNORMAL
SARS-COV-2 AG UPPER RESP QL IA.RAPID: NOT DETECTED
SODIUM SERPL-SCNC: 137 MMOL/L (ref 136–145)
SP GR UR STRIP: 1.01 (ref 1–1.03)
SQUAMOUS #/AREA URNS HPF: ABNORMAL /HPF
TRIGL SERPL-MCNC: 150 MG/DL (ref 0–150)
TSH SERPL DL<=0.05 MIU/L-ACNC: 4.52 UIU/ML (ref 0.27–4.2)
UROBILINOGEN UR QL STRIP: ABNORMAL
VLDLC SERPL-MCNC: 26 MG/DL (ref 5–40)
WBC # UR STRIP: ABNORMAL /HPF
WBC NRBC COR # BLD: 9.72 10*3/MM3 (ref 3.4–10.8)

## 2023-03-17 PROCEDURE — 85027 COMPLETE CBC AUTOMATED: CPT

## 2023-03-17 PROCEDURE — 3078F DIAST BP <80 MM HG: CPT | Performed by: NURSE PRACTITIONER

## 2023-03-17 PROCEDURE — 1160F RVW MEDS BY RX/DR IN RCRD: CPT | Performed by: NURSE PRACTITIONER

## 2023-03-17 PROCEDURE — 80061 LIPID PANEL: CPT

## 2023-03-17 PROCEDURE — 86803 HEPATITIS C AB TEST: CPT

## 2023-03-17 PROCEDURE — 36415 COLL VENOUS BLD VENIPUNCTURE: CPT

## 2023-03-17 PROCEDURE — 1159F MED LIST DOCD IN RCRD: CPT | Performed by: NURSE PRACTITIONER

## 2023-03-17 PROCEDURE — 84439 ASSAY OF FREE THYROXINE: CPT

## 2023-03-17 PROCEDURE — 87428 SARSCOV & INF VIR A&B AG IA: CPT | Performed by: NURSE PRACTITIONER

## 2023-03-17 PROCEDURE — 3074F SYST BP LT 130 MM HG: CPT | Performed by: NURSE PRACTITIONER

## 2023-03-17 PROCEDURE — 99214 OFFICE O/P EST MOD 30 MIN: CPT | Performed by: NURSE PRACTITIONER

## 2023-03-17 PROCEDURE — 83036 HEMOGLOBIN GLYCOSYLATED A1C: CPT

## 2023-03-17 PROCEDURE — 84443 ASSAY THYROID STIM HORMONE: CPT

## 2023-03-17 PROCEDURE — 80053 COMPREHEN METABOLIC PANEL: CPT

## 2023-03-17 PROCEDURE — 81001 URINALYSIS AUTO W/SCOPE: CPT

## 2023-03-17 RX ORDER — GABAPENTIN 300 MG/1
CAPSULE ORAL
Qty: 90 CAPSULE | Refills: 0 | Status: SHIPPED | OUTPATIENT
Start: 2023-03-17

## 2023-03-17 RX ORDER — METHYLPREDNISOLONE 4 MG/1
TABLET ORAL
Qty: 21 TABLET | Refills: 0 | Status: SHIPPED | OUTPATIENT
Start: 2023-03-17

## 2023-03-17 NOTE — PROGRESS NOTES
"     Follow Up Office Visit      Date: 2023   Patient Name: Christina Guillen  : 1945   MRN: [id number]     Chief Complaint:    Chief Complaint   Patient presents with   • Herpes Zoster     Pt states pain has gotten worse, more of rash has started, not able to sleep       History of Present Illness: Christina Guillen is a 77 y.o. female who is here today to follow up with pain from Shingles.  Recording stopped abruptly and plan not dictated. Some information may be missing.     -new eruption on back Wednesday PM.  -pain is worse. Now across entire upper back. Down right arm (wrist to shoulder)  -fever last night 100.5  -notable weakness in RUE, cant scroll thru FB. Feels \"heavy\"  -trouble sleeping  -    Subjective      Review of Systems:   Review of Systems   Constitutional: Positive for appetite change (decreased ) and fever.   HENT: Positive for postnasal drip and rhinorrhea. Negative for congestion, sinus pressure, sneezing and sore throat. Ear pain: mild ear pain this AM.    Eyes: Negative for blurred vision, double vision, pain and visual disturbance.   Respiratory: Negative for cough and shortness of breath.    Cardiovascular: Negative for chest pain.   Musculoskeletal: Negative for neck pain and neck stiffness.        C/o general \"achiness\"    Skin: Positive for rash.   Neurological: Positive for numbness. Negative for dizziness, facial asymmetry, weakness, light-headedness, headache and confusion.       I have reviewed the patients family history, social history, past medical history, past surgical history and have updated it as appropriate.     Medications:     Current Outpatient Medications:   •  amLODIPine (NORVASC) 5 MG tablet, Take 1 tablet by mouth Daily., Disp: 90 tablet, Rfl: 0  •  aspirin 81 MG EC tablet, Take 1 tablet by mouth Daily., Disp: , Rfl:   •  atenolol (TENORMIN) 50 MG tablet, Take 1 tablet by mouth Daily., Disp: 90 tablet, Rfl: 0  •  Cranberry 125 MG tablet, Take  by mouth., Disp: , " "Rfl:   •  levothyroxine (SYNTHROID, LEVOTHROID) 50 MCG tablet, Take 1 tablet by mouth Daily., Disp: , Rfl:   •  pravastatin (PRAVACHOL) 20 MG tablet, Take 1 tablet by mouth Daily., Disp: 90 tablet, Rfl: 0  •  valACYclovir (Valtrex) 1000 MG tablet, Take 1 tablet by mouth 3 (Three) Times a Day for 7 days., Disp: 21 tablet, Rfl: 0  •  gabapentin (NEURONTIN) 300 MG capsule, 1 tablet (300mg) on day one. 1 tablet (300mg) twice daily on day two. 1 tablet (300mg) three times daily on day 3. Max 900mg/24hr., Disp: 90 capsule, Rfl: 0  •  methylPREDNISolone (MEDROL) 4 MG dose pack, Take as directed on package instructions., Disp: 21 tablet, Rfl: 0    Allergies:   Allergies   Allergen Reactions   • Levofloxacin Hives   • Erythromycin Rash   • Macrobid [Nitrofurantoin] Nausea Only   • Penicillins Rash   • Septra [Sulfamethoxazole-Trimethoprim] Rash       Objective     Physical Exam: Please see above  Vital Signs:   Vitals:    03/17/23 1125   BP: 124/72   Pulse: 73   Resp: 16   Temp: 96.2 °F (35.7 °C)   SpO2: 97%   Weight: 67.1 kg (148 lb)   Height: 162.6 cm (64\")   PainSc:   8     Body mass index is 25.4 kg/m².    Physical Exam  Vitals and nursing note reviewed.         Procedures    Results:   Imaging:   ***  Labs:   ***     Assessment / Plan      Assessment/Plan:   Diagnoses and all orders for this visit:    1. Fever, unspecified fever cause (Primary)  -     POCT SARS-CoV-2 Antigen HARLAN + Flu  -     CBC (No Diff); Future  -     Basic metabolic panel; Future    2. Kidney dysfunction  -     Basic metabolic panel; Future    3. Post herpetic neuralgia  -     methylPREDNISolone (MEDROL) 4 MG dose pack; Take as directed on package instructions.  Dispense: 21 tablet; Refill: 0  -     gabapentin (NEURONTIN) 300 MG capsule; 1 tablet (300mg) on day one. 1 tablet (300mg) twice daily on day two. 1 tablet (300mg) three times daily on day 3. Max 900mg/24hr.  Dispense: 90 capsule; Refill: 0  -     XR Spine Cervical 3 View; Future    4. " "Controlled substance agreement signed  -     Compliance Drug Analysis, Ur - Urine, Clean Catch; Future    5. Encounter for long-term (current) use of other medications  -     Compliance Drug Analysis, Ur - Urine, Clean Catch; Future         Follow Up:   Return if symptoms worsen or fail to improve, for Next scheduled follow up.    RUBINA Manning  [facility]     Transcribed from ambient dictation for RUBINA Huizar by Renita Henriquez.  03/17/23   15:18 EDT    {ELENA Provider Statement:47643::\"Patient or patient representative verbalized consent to the visit recording.\",\"I have personally performed the services described in this document as transcribed by the above individual, and it is both accurate and complete.\"}    "

## 2023-03-18 LAB — T4 FREE SERPL-MCNC: 1.28 NG/DL (ref 0.93–1.7)

## 2023-03-20 ENCOUNTER — TELEPHONE (OUTPATIENT)
Dept: INTERNAL MEDICINE | Facility: CLINIC | Age: 78
End: 2023-03-20
Payer: MEDICARE

## 2023-03-20 NOTE — TELEPHONE ENCOUNTER
----- Message from RUBINA Tracey sent at 3/20/2023  5:08 AM EDT -----  Please call pt with lab results and check on how her shingles symptoms are/ any improvement over the weekend. Send me a message with update on her status. Further kidney dysfunction noted- do not use any NSAIDs (advil, aleve, ibuprofen, ect). If you have pain, take tylenol/ acetaminophen. A1C increased and in prediabetic range. TSH above normal range at 4.5. Once I get update on how she is feeling, I recommend increasing synthroid dose from 50 to 75mcg daily with repeat thyroid labs in ~6 weeks. All other labs resulted in normal limits.

## 2023-03-20 NOTE — TELEPHONE ENCOUNTER
Pt returned call and I gave results, pt verbalized understanding. Pt shingles symptoms are doing much better.

## 2023-03-21 ENCOUNTER — DOCUMENTATION (OUTPATIENT)
Dept: INTERNAL MEDICINE | Facility: CLINIC | Age: 78
End: 2023-03-21
Payer: MEDICARE

## 2023-03-22 NOTE — TELEPHONE ENCOUNTER
I called patient advised her of Macie's information. No Answer went to  and I left a detailed message per her BH Verbal.

## 2023-03-22 NOTE — TELEPHONE ENCOUNTER
Glad she is feeling better. It is ok to continue currently prescribed dosing of all medication, and we will plan to meet back in office 4/11 for follow up, as scheduled.

## 2023-03-22 NOTE — PROGRESS NOTES
Reviewed labs from 3/17/23. Update from patient is her condition is improved. Notable abnormal results are as follows:   -TSH elevated= 4.52, free T4 WNL. I do not have baseline or previos TSH to compare with. Will plan to repeat TSH when she returns 4/11 and if persistent/ further increased will increase levothyroxine dose from 50 to 75mcg qd and recheck lab in 6 weeks.  -glucose= 95, A1C= 5.9% consistent with prediabetes. Will plan to discuss this when she returns 4/11 and offer referral to diabetes education  -Worsening kidney function: creatinine= 1.2, GFR= 46.4, and Creatine clearance= 41mL/min (36 when modified for overweight pt using adjusted body weight of 60kg). Recommendations per UTD for gabapentin maintenance dose adjustment ~50% reduced, max 900mg/day in 2-3 divided doses. This is what she is currently taking. We will reassess need for continued treatment in the future or sooner for worsening renal function. Will initiate specific plan for kidney fx on 4/11  -UA with trace leuks and 3-5 WBC microscopic. Will plan to assess for uti sxs on 4/11 and possibly repeat UA/ culture if appropriate. The UDS ordered 3/17 appears to have not been sent, so we will also plan to collect specimen for UDS on 4/11 as well.     Otherwise HCV screen, blood counts, electrolytes, liver fx, and lipid panel all WNL.

## 2023-03-23 ENCOUNTER — OFFICE VISIT (OUTPATIENT)
Dept: INTERNAL MEDICINE | Facility: CLINIC | Age: 78
End: 2023-03-23
Payer: MEDICARE

## 2023-03-23 VITALS
BODY MASS INDEX: 25.27 KG/M2 | HEART RATE: 59 BPM | OXYGEN SATURATION: 98 % | HEIGHT: 64 IN | WEIGHT: 148 LBS | RESPIRATION RATE: 16 BRPM | TEMPERATURE: 97.6 F | SYSTOLIC BLOOD PRESSURE: 114 MMHG | DIASTOLIC BLOOD PRESSURE: 62 MMHG

## 2023-03-23 DIAGNOSIS — R73.03 PREDIABETES: ICD-10-CM

## 2023-03-23 DIAGNOSIS — B02.29 POST HERPETIC NEURALGIA: Primary | ICD-10-CM

## 2023-03-23 PROCEDURE — 1159F MED LIST DOCD IN RCRD: CPT | Performed by: NURSE PRACTITIONER

## 2023-03-23 PROCEDURE — 3078F DIAST BP <80 MM HG: CPT | Performed by: NURSE PRACTITIONER

## 2023-03-23 PROCEDURE — 99214 OFFICE O/P EST MOD 30 MIN: CPT | Performed by: NURSE PRACTITIONER

## 2023-03-23 PROCEDURE — 3074F SYST BP LT 130 MM HG: CPT | Performed by: NURSE PRACTITIONER

## 2023-03-23 PROCEDURE — 1160F RVW MEDS BY RX/DR IN RCRD: CPT | Performed by: NURSE PRACTITIONER

## 2023-03-23 NOTE — PROGRESS NOTES
Follow Up Office Visit      Date: 2023   Patient Name: Christina Guillen  : 1945   MRN: [id number]     Chief Complaint:    Chief Complaint   Patient presents with   • Herpes Zoster     Pt states the pain w/ shingles outbreak hasn't improved, interrupting sleep       History of Present Illness: Christina Guillen is a 77 y.o. female who is here today to follow up with shingles.     The patient has consented to the use of ELENA.   The patient presents to the clinic for follow-up on shingles.   She is accompanied by spouse during this visit.     Shingles.   Patient states that the pain due to her shingles outbreak has not improved.   She continues to have burning pain on her arms, chest, and back.   Pain has been interrupting with her sleep.   Patient completed her course of valacyclovir.   She is currently taking gabapentin 300 mg capsule 3 times a day.   She denies any itchiness.     Prediabetes.   Her last hemoglobin A1c on 2023 was 5.9 percent.       Subjective      Review of Systems:   Review of Systems   Skin: Rash: improved.        Still burning    Neurological: Positive for numbness (pain ).       I have reviewed the patients family history, social history, past medical history, past surgical history and have updated it as appropriate.     Medications:     Current Outpatient Medications:   •  amLODIPine (NORVASC) 5 MG tablet, Take 1 tablet by mouth Daily., Disp: 90 tablet, Rfl: 0  •  aspirin 81 MG EC tablet, Take 1 tablet by mouth Daily., Disp: , Rfl:   •  atenolol (TENORMIN) 50 MG tablet, Take 1 tablet by mouth Daily., Disp: 90 tablet, Rfl: 0  •  Cranberry 125 MG tablet, Take  by mouth., Disp: , Rfl:   •  gabapentin (NEURONTIN) 300 MG capsule, 1 tablet (300mg) on day one. 1 tablet (300mg) twice daily on day two. 1 tablet (300mg) three times daily on day 3. Max 900mg/24hr., Disp: 90 capsule, Rfl: 0  •  levothyroxine (SYNTHROID, LEVOTHROID) 50 MCG tablet, Take 1 tablet by mouth Daily., Disp: , Rfl:  "  •  methylPREDNISolone (MEDROL) 4 MG dose pack, Take as directed on package instructions., Disp: 21 tablet, Rfl: 0  •  pravastatin (PRAVACHOL) 20 MG tablet, Take 1 tablet by mouth Daily., Disp: 90 tablet, Rfl: 0    Allergies:   Allergies   Allergen Reactions   • Levofloxacin Hives   • Erythromycin Rash   • Macrobid [Nitrofurantoin] Nausea Only   • Penicillins Rash   • Septra [Sulfamethoxazole-Trimethoprim] Rash       Objective     Physical Exam: Please see above  Vital Signs:   Vitals:    03/23/23 0828   BP: 114/62   Pulse: 59   Resp: 16   Temp: 97.6 °F (36.4 °C)   SpO2: 98%   Weight: 67.1 kg (148 lb)   Height: 162.6 cm (64\")   PainSc:   7     Body mass index is 25.4 kg/m².    Physical Exam  Vitals and nursing note reviewed.   Constitutional:       General: She is not in acute distress.     Appearance: Normal appearance.   Cardiovascular:      Rate and Rhythm: Normal rate and regular rhythm.      Heart sounds: Normal heart sounds.   Pulmonary:      Effort: Pulmonary effort is normal. No respiratory distress.      Breath sounds: Normal breath sounds.   Skin:     Comments: Improved    Neurological:      Mental Status: She is alert and oriented to person, place, and time.           Results:   Imaging:     Labs:       Assessment / Plan      Assessment/Plan:   Diagnoses and all orders for this visit:    1. Post herpetic neuralgia (Primary)    2. Prediabetes         Postherpetic pain.   Laboratory results were discussed with the patient.   Continue with gabapentin.  Discussed unfortunate expectations of known for certain expectation of sxs-- potentially yrs   Recommended shingles vaccine after complete resolution of her rash or symptoms.   Follow-up if symptoms worsen or fail to improve.     Prediabetes.   Her last hemoglobin A1c on 03/17/2023 was 5.9 percent.   Keep appointment on 04/11/2023 and will discuss about diabetes educational referral.   Encouraged to improve diet and exercise.       Follow Up:   Return if " symptoms worsen or fail to improve, for Next scheduled follow up.    RUBINA Manning  [facility]   Transcribed from ambient dictation for RUBINA Huizar by Angela Ya.  03/23/23   10:41 EDT    Patient or patient representative verbalized consent to the visit recording.  I have personally performed the services described in this document as transcribed by the above individual, and it is both accurate and complete.

## 2023-04-06 ENCOUNTER — TELEPHONE (OUTPATIENT)
Dept: INTERNAL MEDICINE | Facility: CLINIC | Age: 78
End: 2023-04-06
Payer: MEDICARE

## 2023-04-06 DIAGNOSIS — C50.911 MALIGNANT NEOPLASM OF RIGHT FEMALE BREAST, UNSPECIFIED ESTROGEN RECEPTOR STATUS, UNSPECIFIED SITE OF BREAST: ICD-10-CM

## 2023-04-06 DIAGNOSIS — Z90.11 STATUS POST RIGHT MASTECTOMY: Primary | ICD-10-CM

## 2023-04-06 NOTE — TELEPHONE ENCOUNTER
[facility] IS ASKING FOR A WRITTEN PRESCRIPTION FOR 2 MASTECTOMY BRAS FOR TODAY'S DATE 4/6/23. PLEASE FAX TO: [phone]    PHONE: [phone]

## 2023-04-10 ENCOUNTER — TELEPHONE (OUTPATIENT)
Dept: INTERNAL MEDICINE | Facility: CLINIC | Age: 78
End: 2023-04-10

## 2023-04-10 NOTE — TELEPHONE ENCOUNTER
Caller: Hima Guillen    Relationship: Self    Best call back number: [phone]    What orders are you requesting (i.e. lab or imaging): LABS    In what timeframe would the patient need to come in: TODAY    Where will you receive your lab/imaging services: IN OFFICE     Additional notes:  PATIENT WANTED TO GET LAB WORK TODAY BEFORE HER MWV TOMORROW

## 2023-04-11 ENCOUNTER — OFFICE VISIT (OUTPATIENT)
Dept: INTERNAL MEDICINE | Facility: CLINIC | Age: 78
End: 2023-04-11
Payer: MEDICARE

## 2023-04-11 ENCOUNTER — LAB (OUTPATIENT)
Dept: LAB | Facility: HOSPITAL | Age: 78
End: 2023-04-11
Payer: MEDICARE

## 2023-04-11 VITALS
TEMPERATURE: 96.8 F | BODY MASS INDEX: 26.29 KG/M2 | HEIGHT: 64 IN | DIASTOLIC BLOOD PRESSURE: 62 MMHG | HEART RATE: 59 BPM | WEIGHT: 154 LBS | RESPIRATION RATE: 16 BRPM | OXYGEN SATURATION: 95 % | SYSTOLIC BLOOD PRESSURE: 116 MMHG

## 2023-04-11 DIAGNOSIS — E03.9 HYPOTHYROIDISM, UNSPECIFIED TYPE: ICD-10-CM

## 2023-04-11 DIAGNOSIS — N28.9 KIDNEY DYSFUNCTION: ICD-10-CM

## 2023-04-11 DIAGNOSIS — Z00.00 ENCOUNTER FOR MEDICARE ANNUAL WELLNESS EXAM: Primary | ICD-10-CM

## 2023-04-11 LAB
ANION GAP SERPL CALCULATED.3IONS-SCNC: 12.5 MMOL/L (ref 5–15)
BUN SERPL-MCNC: 13 MG/DL (ref 8–23)
BUN/CREAT SERPL: 12.1 (ref 7–25)
CALCIUM SPEC-SCNC: 9.8 MG/DL (ref 8.6–10.5)
CHLORIDE SERPL-SCNC: 103 MMOL/L (ref 98–107)
CO2 SERPL-SCNC: 24.5 MMOL/L (ref 22–29)
CREAT SERPL-MCNC: 1.07 MG/DL (ref 0.57–1)
EGFRCR SERPLBLD CKD-EPI 2021: 53.3 ML/MIN/1.73
GLUCOSE SERPL-MCNC: 85 MG/DL (ref 65–99)
POTASSIUM SERPL-SCNC: 3.8 MMOL/L (ref 3.5–5.2)
SODIUM SERPL-SCNC: 140 MMOL/L (ref 136–145)
TSH SERPL DL<=0.05 MIU/L-ACNC: 2.36 UIU/ML (ref 0.27–4.2)

## 2023-04-11 PROCEDURE — 84443 ASSAY THYROID STIM HORMONE: CPT

## 2023-04-11 PROCEDURE — 1160F RVW MEDS BY RX/DR IN RCRD: CPT | Performed by: NURSE PRACTITIONER

## 2023-04-11 PROCEDURE — G0439 PPPS, SUBSEQ VISIT: HCPCS | Performed by: NURSE PRACTITIONER

## 2023-04-11 PROCEDURE — 36415 COLL VENOUS BLD VENIPUNCTURE: CPT

## 2023-04-11 PROCEDURE — 1159F MED LIST DOCD IN RCRD: CPT | Performed by: NURSE PRACTITIONER

## 2023-04-11 PROCEDURE — 3078F DIAST BP <80 MM HG: CPT | Performed by: NURSE PRACTITIONER

## 2023-04-11 PROCEDURE — 80048 BASIC METABOLIC PNL TOTAL CA: CPT

## 2023-04-11 PROCEDURE — 3074F SYST BP LT 130 MM HG: CPT | Performed by: NURSE PRACTITIONER

## 2023-04-11 PROCEDURE — 1170F FXNL STATUS ASSESSED: CPT | Performed by: NURSE PRACTITIONER

## 2023-04-11 NOTE — PROGRESS NOTES
The ABCs of the Annual Wellness Visit  Subsequent Medicare Wellness Visit    Subjective    Christina Guillen is a 77 y.o. female who presents for a Subsequent Medicare Wellness Visit.    The following portions of the patient's history were reviewed and   updated as appropriate: allergies, current medications, past family history, past medical history, past social history, past surgical history and problem list.    Compared to one year ago, the patient feels her physical   health is worse. Due to shingles     Compared to one year ago, the patient feels her mental   health is worse. Due to gabapentin     Recent Hospitalizations:  She was not admitted to the hospital during the last year.       Current Medical Providers:  Patient Care Team:  Macie Lynn APRN as PCP - General (Family Medicine)    Outpatient Medications Prior to Visit   Medication Sig Dispense Refill   • amLODIPine (NORVASC) 5 MG tablet Take 1 tablet by mouth Daily. 90 tablet 0   • aspirin 81 MG EC tablet Take 1 tablet by mouth Daily.     • atenolol (TENORMIN) 50 MG tablet Take 1 tablet by mouth Daily. 90 tablet 0   • Cranberry 125 MG tablet Take  by mouth.     • gabapentin (NEURONTIN) 300 MG capsule 1 tablet (300mg) on day one. 1 tablet (300mg) twice daily on day two. 1 tablet (300mg) three times daily on day 3. Max 900mg/24hr. 90 capsule 0   • levothyroxine (SYNTHROID, LEVOTHROID) 50 MCG tablet Take 1 tablet by mouth Daily.     • pravastatin (PRAVACHOL) 20 MG tablet Take 1 tablet by mouth Daily. 90 tablet 0   • methylPREDNISolone (MEDROL) 4 MG dose pack Take as directed on package instructions. 21 tablet 0     No facility-administered medications prior to visit.       No opioid medication identified on active medication list. I have reviewed chart for other potential  high risk medication/s and harmful drug interactions in the elderly.          Aspirin is on active medication list. Aspirin use is indicated based on review of current medical  "condition/s. Pros and cons of this therapy have been discussed today. Benefits of this medication outweigh potential harm.  Patient has been encouraged to continue taking this medication.  .      Patient Active Problem List   Diagnosis   • Chronic pain of right knee   • Gastroesophageal reflux disease without esophagitis   • Breast pain, left   • Lower urinary tract symptoms (LUTS)   • Urinary tract infectious disease   • Healthcare maintenance   • Squamous cell skin cancer   • Hypothyroidism   • Hyperlipidemia   • Hypertensive disorder     Advance Care Planning   Advance Care Planning     Advance Directive is not on file.  ACP discussion was held with the patient during this visit. Patient does not have an advance directive, information provided.     Objective    Vitals:    23 0932   BP: 116/62   Pulse: 59   Resp: 16   Temp: 96.8 °F (36 °C)   SpO2: 95%   Weight: 69.9 kg (154 lb)   Height: 162.6 cm (64\")   PainSc: 0-No pain     Estimated body mass index is 26.43 kg/m² as calculated from the following:    Height as of this encounter: 162.6 cm (64\").    Weight as of this encounter: 69.9 kg (154 lb).    BMI is >= 25 and <30. (Overweight) The following options were offered after discussion;: exercise counseling/recommendations and nutrition counseling/recommendations      Does the patient have evidence of cognitive impairment? No    Lab Results   Component Value Date    TRIG 150 2023    HDL 46 2023    LDL 75 2023    VLDL 26 2023    HGBA1C 5.90 (H) 2023        HEALTH RISK ASSESSMENT    Smoking Status:  Social History     Tobacco Use   Smoking Status Never   Smokeless Tobacco Never     Alcohol Consumption:  Social History     Substance and Sexual Activity   Alcohol Use Never     Fall Risk Screen:    STEADI Fall Risk Assessment was completed, and patient is at LOW risk for falls.Assessment completed on:2023    Depression Screenin/11/2023     9:36 AM   PHQ-2/PHQ-9 Depression " Screening   Little Interest or Pleasure in Doing Things 3-->nearly every day   Feeling Down, Depressed or Hopeless 3-->nearly every day   Trouble Falling or Staying Asleep, or Sleeping Too Much 0-->not at all   Feeling Tired or Having Little Energy 1-->several days   Poor Appetite or Overeating 0-->not at all   Feeling Bad about Yourself - or that You are a Failure or Have Let Yourself or Your Family Down 0-->not at all   Trouble Concentrating on Things, Such as Reading the Newspaper or Watching Television 0-->not at all   Moving or Speaking So Slowly that Other People Could Have Noticed? Or the Opposite - Being So Fidgety 0-->not at all   Thoughts that You Would be Better Off Dead or of Hurting Yourself in Some Way 0-->not at all   PHQ-9: Brief Depression Severity Measure Score 7   If You Checked Off Any Problems, How Difficult Have These Problems Made It For You to Do Your Work, Take Care of Things at Home, or Get Along with Other People? somewhat difficult       Health Habits and Functional and Cognitive Screenin/11/2023     9:35 AM   Functional & Cognitive Status   Do you have difficulty preparing food and eating? No   Do you have difficulty bathing yourself, getting dressed or grooming yourself? No   Do you have difficulty using the toilet? No   Do you have difficulty moving around from place to place? No   Do you have trouble with steps or getting out of a bed or a chair? No   Current Diet Well Balanced Diet   Dental Exam Not up to date   Eye Exam Not up to date   Exercise (times per week) 0 times per week   Current Exercises Include No Regular Exercise   Do you need help using the phone?  No   Are you deaf or do you have serious difficulty hearing?  No   Do you need help with transportation? No   Do you need help shopping? No   Do you need help preparing meals?  No   Do you need help with housework?  No   Do you need help with laundry? No   Do you need help taking your medications? No   Do you need  help managing money? No   Do you ever drive or ride in a car without wearing a seat belt? No   Have you felt unusual stress, anger or loneliness in the last month? No   Who do you live with? Spouse   If you need help, do you have trouble finding someone available to you? No   Have you been bothered in the last four weeks by sexual problems? No   Do you have difficulty concentrating, remembering or making decisions? No       Age-appropriate Screening Schedule:  Refer to the list below for future screening recommendations based on patient's age, sex and/or medical conditions. Orders for these recommended tests are listed in the plan section. The patient has been provided with a written plan.    Health Maintenance   Topic Date Due   • DXA SCAN  Never done   • ZOSTER VACCINE (1 of 2) 03/14/2024 (Originally 4/14/1995)   • COVID-19 Vaccine (1) 03/19/2025 (Originally 1945)   • TDAP/TD VACCINES (2 - Td or Tdap) 05/03/2023   • MAMMOGRAM  06/27/2023   • INFLUENZA VACCINE  08/01/2023   • LIPID PANEL  03/17/2024   • ANNUAL WELLNESS VISIT  04/11/2024   • HEPATITIS C SCREENING  Completed   • Pneumococcal Vaccine 65+  Completed                  CMS Preventative Services Quick Reference  Risk Factors Identified During Encounter  Immunizations Discussed/Encouraged: Prevnar 20 (Pneumococcal 20-valent conjugate) and COVID19  Dental Screening Recommended  Vision Screening Recommended  The above risks/problems have been discussed with the patient.  Pertinent information has been shared with the patient in the After Visit Summary.  An After Visit Summary and PPPS were made available to the patient.    Follow Up:   Next Medicare Wellness visit to be scheduled in 1 year.       Additional E&M Note during same encounter follows:  Patient has multiple medical problems which are significant and separately identifiable that require additional work above and beyond the Medicare Wellness Visit.      Chief Complaint  Medicare  "Wellness-subsequent    Subjective        HPI  Christina Guillen is also being seen today for subsequent Medicare wellness.  She continues to have postherpetic neuralgia symptoms from recent shingles.  She continues gabapentin which only helps slightly reduce symptoms.  She has struggled with cognitive changes since starting this medication and is concerned about her kidney function.    Review of Systems   Constitutional: Negative for activity change and fatigue.   HENT: Positive for hearing loss.    Eyes: Negative.    Respiratory: Negative.    Cardiovascular: Negative.    Gastrointestinal: Negative.    Endocrine: Negative.    Genitourinary: Negative.    Musculoskeletal: Negative.    Skin: Negative.    Neurological: Positive for numbness.        Continued nerve pain r/t shingles    Hematological: Negative.    Psychiatric/Behavioral: Negative.        Objective   Vital Signs:  /62   Pulse 59   Temp 96.8 °F (36 °C)   Resp 16   Ht 162.6 cm (64\")   Wt 69.9 kg (154 lb)   SpO2 95%   BMI 26.43 kg/m²     Physical Exam     The following data was reviewed by: RUBINA Huizar on 04/11/2023:  Common labs        5/8/2022    09:49 8/30/2022    09:09 3/17/2023    13:04   Common Labs   Glucose 105    95     BUN 21    15     Creatinine 1.16    1.20     Sodium 138    137     Potassium 4.3    4.4     Chloride 104    101     Calcium 9.5    9.8     Albumin 4.30    4.4     Total Bilirubin 0.3    0.3     Alkaline Phosphatase 95    93     AST (SGOT) 19    21     ALT (SGPT) 11    13     WBC 8.36    9.72     Hemoglobin 11.0    12.0     Hematocrit 32.8    36.9     Platelets 241    238     Total Cholesterol  151   147     Triglycerides  111   150     HDL Cholesterol  47   46     LDL Cholesterol   84   75     Hemoglobin A1C   5.90       Data reviewed: .           Assessment and Plan   Diagnoses and all orders for this visit:    1. Encounter for Medicare annual wellness exam (Primary)  -Recent labs reviewed.  We will recheck TSH " and BMP today  -I will request dexa, if >2yrs we will repeat. I will contact her with repeat lab from today and results of last dexa     2. Hypothyroidism, unspecified type  -     TSH Rfx On Abnormal To Free T4; Future    3. Kidney dysfunction  -     Basic metabolic panel; Future         I spent 30 minutes caring for Christina on this date of service. This time includes time spent by me in the following activities:preparing for the visit, reviewing tests, obtaining and/or reviewing a separately obtained history, performing a medically appropriate examination and/or evaluation , counseling and educating the patient/family/caregiver, ordering medications, tests, or procedures, documenting information in the medical record, independently interpreting results and communicating that information with the patient/family/caregiver and care coordination  Follow Up   No follow-ups on file.  Patient was given instructions and counseling regarding her condition or for health maintenance advice. Please see specific information pulled into the AVS if appropriate.

## 2023-04-15 ENCOUNTER — TELEPHONE (OUTPATIENT)
Dept: INTERNAL MEDICINE | Facility: CLINIC | Age: 78
End: 2023-04-15
Payer: MEDICARE

## 2023-04-15 NOTE — TELEPHONE ENCOUNTER
----- Message from RUBINA Tracey sent at 4/15/2023  4:07 AM EDT -----  Repeat thyroid lab resulted in normal range. Repeat kidney function was improved from prior 4 weeks ago, but not quite back to normal range yet.

## 2023-04-18 ENCOUNTER — TELEPHONE (OUTPATIENT)
Dept: INTERNAL MEDICINE | Facility: CLINIC | Age: 78
End: 2023-04-18
Payer: MEDICARE

## 2023-04-18 ENCOUNTER — TELEPHONE (OUTPATIENT)
Dept: INTERNAL MEDICINE | Facility: CLINIC | Age: 78
End: 2023-04-18

## 2023-04-18 DIAGNOSIS — B02.29 POST HERPETIC NEURALGIA: Primary | ICD-10-CM

## 2023-04-18 RX ORDER — CAPSAICIN 0.75 MG/G
CREAM TOPICAL
Qty: 120 G | Refills: 1 | Status: SHIPPED | OUTPATIENT
Start: 2023-04-18

## 2023-04-18 RX ORDER — GABAPENTIN 300 MG/1
CAPSULE ORAL
Qty: 12 CAPSULE | Refills: 0 | Status: SHIPPED | OUTPATIENT
Start: 2023-04-18

## 2023-04-18 RX ORDER — AMITRIPTYLINE HYDROCHLORIDE 25 MG/1
25 TABLET, FILM COATED ORAL NIGHTLY
Qty: 30 TABLET | Refills: 1 | Status: SHIPPED | OUTPATIENT
Start: 2023-04-18

## 2023-04-18 NOTE — TELEPHONE ENCOUNTER
Caller: Christina Guillen    Relationship: Self    Best call back number: [phone]    What medication are you requesting: SOMETHING FOR SYMPTOMS     What are your current symptoms: BURNING FEELING ON SKIN ACROSS CHEST, BACK, AND DOWN ARM    How long have you been experiencing symptoms: OVER A MONTH     Have you had these symptoms before:    [x] Yes  [] No    Have you been treated for these symptoms before:   [x] Yes  [] No    If a prescription is needed, what is your preferred pharmacy and phone number: [address] [facility] [phone] [facility] [phone] FX     Additional notes: PATIENT STATES SHE WAS PRESCRIBED GABAPENTIN FOR THIS BUT HAS RAN OUT AND WOULD LIKE TO KNOW IF THERE IS ANOTHER MEDICATION THAT IS NOT A CONTROLLED SUBSTANCE THAT SHE CAN BE PRESCRIBED THAT WILL HELP WITH THIS.

## 2023-04-18 NOTE — TELEPHONE ENCOUNTER
Pt was wanting to see if there are any withdrawal sxs from gabapentin, pt also states that it seems to not be helping very well for the pain.

## 2023-04-18 NOTE — TELEPHONE ENCOUNTER
Caller: Christina Guillen    Relationship: Self    Best call back number: [phone]    What medications are you currently taking:   Current Outpatient Medications on File Prior to Visit   Medication Sig Dispense Refill   • amLODIPine (NORVASC) 5 MG tablet Take 1 tablet by mouth Daily. 90 tablet 0   • aspirin 81 MG EC tablet Take 1 tablet by mouth Daily.     • atenolol (TENORMIN) 50 MG tablet Take 1 tablet by mouth Daily. 90 tablet 0   • Cranberry 125 MG tablet Take  by mouth.     • gabapentin (NEURONTIN) 300 MG capsule 1 tablet (300mg) on day one. 1 tablet (300mg) twice daily on day two. 1 tablet (300mg) three times daily on day 3. Max 900mg/24hr. 90 capsule 0   • levothyroxine (SYNTHROID, LEVOTHROID) 50 MCG tablet Take 1 tablet by mouth Daily.     • pravastatin (PRAVACHOL) 20 MG tablet Take 1 tablet by mouth Daily. 90 tablet 0     No current facility-administered medications on file prior to visit.          When did you start taking these medications:     Which medication are you concerned about: gabapentin (NEURONTIN) 300 MG capsule    Who prescribed you this medication:     What are your concerns: PATIENT WOULD LIKE A CALL BACK TO DISCUSS WITHDRAWALS FROM THE MEDICATION     How long have you had these concerns:

## 2023-05-30 DIAGNOSIS — E78.5 HYPERLIPIDEMIA, UNSPECIFIED HYPERLIPIDEMIA TYPE: ICD-10-CM

## 2023-05-30 DIAGNOSIS — I10 HYPERTENSION, UNSPECIFIED TYPE: Primary | ICD-10-CM

## 2023-05-30 RX ORDER — ATENOLOL 50 MG/1
50 TABLET ORAL DAILY
Qty: 90 TABLET | Refills: 0 | Status: SHIPPED | OUTPATIENT
Start: 2023-05-30

## 2023-05-30 RX ORDER — PRAVASTATIN SODIUM 20 MG
20 TABLET ORAL DAILY
Qty: 90 TABLET | Refills: 0 | Status: SHIPPED | OUTPATIENT
Start: 2023-05-30

## 2023-05-30 RX ORDER — AMLODIPINE BESYLATE 5 MG/1
5 TABLET ORAL DAILY
Qty: 90 TABLET | Refills: 0 | Status: SHIPPED | OUTPATIENT
Start: 2023-05-30

## 2023-05-30 NOTE — TELEPHONE ENCOUNTER
Caller: Christina Guillen TASHI    Relationship: Self    Best call back number: [phone]    Requested Prescriptions:   Requested Prescriptions     Pending Prescriptions Disp Refills   • atenolol (TENORMIN) 50 MG tablet 90 tablet 0     Sig: Take 1 tablet by mouth Daily.   • pravastatin (PRAVACHOL) 20 MG tablet 90 tablet 0     Sig: Take 1 tablet by mouth Daily.   • amLODIPine (NORVASC) 5 MG tablet 90 tablet 0     Sig: Take 1 tablet by mouth Daily.        Pharmacy where request should be sent: [address] [phone] [facility] [phone] FX     Last office visit with prescribing clinician: 4/11/2023   Last telemedicine visit with prescribing clinician: Visit date not found   Next office visit with prescribing clinician: 10/10/2023     Additional details provided by patient: THE PATIENT STATES THAT SHE IS OUT OF THE ATENOLOL AND GETTING LOW ON THE OTHER MEDICATIONS THEY WERE LAST PRESCRIBED BY HER PREVIOUS PRIMARY CARE DOCTOR     Does the patient have less than a 3 day supply:  [x] Yes  [] No    Would you like a call back once the refill request has been completed: [] Yes [x] No    If the office needs to give you a call back, can they leave a voicemail: [] Yes [x] No    Socorro Mayer Rep   05/30/23 11:22 EDT

## 2023-07-27 ENCOUNTER — TELEPHONE (OUTPATIENT)
Dept: ORTHOPEDIC SURGERY | Facility: CLINIC | Age: 78
End: 2023-07-27

## 2023-07-27 NOTE — TELEPHONE ENCOUNTER
Caller: Christina Guillen    Relationship to patient: Self    Best call back number: [phone]    Chief complaint: BILATERAL KNEE PAIN     Type of visit: INJECTION    Requested date: ASAP

## 2023-08-03 ENCOUNTER — OFFICE VISIT (OUTPATIENT)
Dept: ORTHOPEDIC SURGERY | Facility: CLINIC | Age: 78
End: 2023-08-03
Payer: MEDICARE

## 2023-08-03 VITALS
SYSTOLIC BLOOD PRESSURE: 140 MMHG | HEIGHT: 64 IN | BODY MASS INDEX: 25.1 KG/M2 | WEIGHT: 147 LBS | DIASTOLIC BLOOD PRESSURE: 78 MMHG

## 2023-08-03 DIAGNOSIS — M25.562 PAIN IN BOTH KNEES, UNSPECIFIED CHRONICITY: Primary | ICD-10-CM

## 2023-08-03 DIAGNOSIS — M17.0 PRIMARY OSTEOARTHRITIS OF BOTH KNEES: ICD-10-CM

## 2023-08-03 DIAGNOSIS — M11.20 CHONDROCALCINOSIS: ICD-10-CM

## 2023-08-03 DIAGNOSIS — M25.561 PAIN IN BOTH KNEES, UNSPECIFIED CHRONICITY: Primary | ICD-10-CM

## 2023-08-03 RX ADMIN — TRIAMCINOLONE ACETONIDE 1 ML: 40 INJECTION, SUSPENSION INTRA-ARTICULAR; INTRAMUSCULAR at 10:06

## 2023-08-03 RX ADMIN — LIDOCAINE HYDROCHLORIDE 4 ML: 10 INJECTION, SOLUTION EPIDURAL; INFILTRATION; INTRACAUDAL; PERINEURAL at 10:06

## 2023-08-03 NOTE — PROGRESS NOTES
Procedure   - Large Joint Arthrocentesis: R knee on 8/3/2023 10:06 AM  Indications: pain  Details: 21 G needle, anterolateral approach  Medications: 1 mL triamcinolone acetonide 40 MG/ML; 4 mL lidocaine PF 1% 1 %  Outcome: tolerated well, no immediate complications  Procedure, treatment alternatives, risks and benefits explained, specific risks discussed. Consent was given by the patient. Immediately prior to procedure a time out was called to verify the correct patient, procedure, equipment, support staff and site/side marked as required. Patient was prepped and draped in the usual sterile fashion.

## 2023-08-03 NOTE — PROGRESS NOTES
"    [facility] Note        Subjective     CC: Pain of the Right Knee and Pain of the Left Knee      HPI    Christina Guillen is a 78 y.o. female. Established patient presents for evaluation of bilateral knee pain. Patient has been doing well and without pain since receiving right knee steroid injection September 2022. Then after having shingles in March 2023 that affected her right shoulder, she began having pain to both knees, right > left.       Pain scale: 3/10.  Severity of the pain moderate.  Quality of the pain throbbing, aching.  Associated symptoms stiffness.  Activity related to pain walking.  Pain eased by nothing.  No reported numbness or tingling.      Overall, patient's symptoms are worsening with increasing pain to both knees, right > left.    ROS:    Constiutional:Pt denies fever, chills, nausea, or vomiting.  MSK:as above        Objective      Past Medical History  Past Medical History:   Diagnosis Date    Arthritis 2020    Breast cancer 1975    right     Cancer     Cataract 2005    Disease of thyroid gland     Fracture of ankle Nov 2020    Hyperlipidemia ??    Hypertension     Hypothyroidism ??    Knee swelling Jan 2021    Urinary tract infection      Social History     Socioeconomic History    Marital status:    Tobacco Use    Smoking status: Never    Smokeless tobacco: Never   Vaping Use    Vaping Use: Never used   Substance and Sexual Activity    Alcohol use: Never    Drug use: Never    Sexual activity: Not Currently     Partners: Male          Physical Exam  /78   Ht 162.6 cm (64\")   Wt 66.7 kg (147 lb)   BMI 25.23 kg/mý     Body mass index is 25.23 kg/mý.    Patient is well nourished and well developed.        Ortho Exam  Bilateral knees  Alignment: Genu varum  Skin: Intact without any erythema, warmth, swelling.  Motion: 0-120ø with crepitus.  Tenderness: Right--positive lateral > medial joint line. Left--positive medial joint line.  Instability: " ACL/PCL/MCL/LCL stable and intact on exam.  Patella: Compression/grind positive.  Straight leg raise: Intact.  Motor: Grossly intact Q/HS/TA/GS/EHL/P  Sensory: Grossly intact DP/SP/S/S/T nerve distributions.       Imaging/Labs/EMG Reviewed:  Ordered bilateral knees plain films.  Imaging read/interpreted by Dr. Hinton.    Indication: Bilateral knee pain     Comparison: Todays xrays were compared to previous xrays from 9/22/2022     IMPRESSION:      Right Knee: moderate tricompartmental arthritis with genu varum alignment, periarticular osteophytes visualized in all compartments.  Chondrocalcinosis identified in the medial and lateral compartments of the knee.  No significant changes compared to prior radiographs.  Left Knee: moderate tricompartmental arthritis with genu varum alignment, periarticular osteophytes visualized in all compartments.  Chondrocalcinosis identified in the medial and lateral compartments of the knee.  No significant changes compared to prior radiographs.      Assessment:  1. Pain in both knees, unspecified chronicity    2. Primary osteoarthritis of both knees    3. Chondrocalcinosis        Plan:  Return/worsening pain bilateral knees due to osteoarthritis and chondrocalcinosis, right > left.  Reviewed imaging with the patient--no change from prior films.  Discussed treatment options, would like to proceed with injection to right knee today.  Offered and accepted corticosteroid injection.  Injection was given today.  Discussed use of visco supplementation.  Will consider at next appointment depending on response to today's treatment. If she would like to try then may contact clinic for pre-authorization.  Recommend OTC pain medication as needed.  Follow up as needed. If she changes her mind regarding left knee steroid injection, then contact clinic to schedule.  Questions and concerns answered.    After discussing the risks, benefits, indications of injection, the patient gave consent to  proceed.  Her right knee was confirmed as the correct joint to be injected with a timeout.  It was then prepped using Hibiclens and injected with a mixture of 4 cc of 1% plain lidocaine and 1 cc of Kenalog (40 mg per mL), without any resistance through the anterior lateral approach, patient in seated position.  Area was cleaned, hemostasis was achieved and a Band-Aid was applied over the injection site.  The patient tolerated procedure well.  I instructed the patient on signs and symptoms of infection.  They should report to the emergency department or return to clinic if any of these develop, for further evaluation and treatment.  Recommended modifying activity for the next 48 hours to include rest, ice, elevation and oral pain medication as needed.        Bailee Quinones PA-C  08/07/23  19:19 EDT      Dictated Utilizing Dragon Dictation.

## 2023-08-07 RX ORDER — TRIAMCINOLONE ACETONIDE 40 MG/ML
1 INJECTION, SUSPENSION INTRA-ARTICULAR; INTRAMUSCULAR
Status: COMPLETED | OUTPATIENT
Start: 2023-08-03 | End: 2023-08-03

## 2023-08-07 RX ORDER — LIDOCAINE HYDROCHLORIDE 10 MG/ML
4 INJECTION, SOLUTION EPIDURAL; INFILTRATION; INTRACAUDAL; PERINEURAL
Status: COMPLETED | OUTPATIENT
Start: 2023-08-03 | End: 2023-08-03

## 2023-08-08 DIAGNOSIS — E03.9 HYPOTHYROIDISM, UNSPECIFIED TYPE: Primary | ICD-10-CM

## 2023-08-08 RX ORDER — LEVOTHYROXINE SODIUM 50 MCG
50 TABLET ORAL DAILY
Qty: 90 TABLET | Refills: 3 | Status: SHIPPED | OUTPATIENT
Start: 2023-08-08

## 2023-08-08 RX ORDER — LEVOTHYROXINE SODIUM 0.05 MG/1
50 TABLET ORAL DAILY
Qty: 90 TABLET | Refills: 1 | Status: CANCELLED | OUTPATIENT
Start: 2023-08-08

## 2023-08-08 NOTE — TELEPHONE ENCOUNTER
Caller: MindyChristina    Relationship: Self    Best call back number: [phone]     Requested Prescriptions:   Requested Prescriptions     Pending Prescriptions Disp Refills    levothyroxine (SYNTHROID, LEVOTHROID) 50 MCG tablet       Sig: Take 1 tablet by mouth Daily.      Pharmacy where request should be sent: [facility] - [address] [phone] [facility] [phone]      Last office visit with prescribing clinician: 4/11/2023   Last telemedicine visit with prescribing clinician: Visit date not found   Next office visit with prescribing clinician: 10/10/2023     Additional details provided by patient: THIS IS NEEDING TO BE A 90 DAY SUPPLY. SHE SAID THIS NEEDS TO BE SYNTHROID SINCE SHE HAS A BAD REACTION TO THE LEVOTHYROXINE. PATIENT HAS ABOUT A WEEK LEFT BUT THIS IS NEEDS ASAP BECAUSE OF THE MAIL DELIVERY.     Does the patient have less than a 3 day supply:  [] Yes  [x] No    Would you like a call back once the refill request has been completed: [x] Yes [] No    If the office needs to give you a call back, can they leave a voicemail: [x] Yes [] No    Socorro Mckeon   08/08/23 15:10 EDT

## 2023-08-11 ENCOUNTER — TELEPHONE (OUTPATIENT)
Dept: INTERNAL MEDICINE | Facility: CLINIC | Age: 78
End: 2023-08-11

## 2023-08-11 NOTE — TELEPHONE ENCOUNTER
PLEASE CALL [facility] WITH A VERBAL OK FOR GENERIC FOR SYNTHROID.  [phone] REFERENCE NUMBER [id number]

## 2023-08-14 ENCOUNTER — TELEPHONE (OUTPATIENT)
Dept: INTERNAL MEDICINE | Facility: CLINIC | Age: 78
End: 2023-08-14
Payer: MEDICARE

## 2023-08-14 NOTE — TELEPHONE ENCOUNTER
Gabby from [facility] said patient's insurance plan doesn't cover the name brand Synthroid.  Please call [phone] and mention reference # [id number] to discuss if there is an acceptable equivalent.

## 2023-08-16 ENCOUNTER — PRIOR AUTHORIZATION (OUTPATIENT)
Dept: INTERNAL MEDICINE | Facility: CLINIC | Age: 78
End: 2023-08-16
Payer: MEDICARE

## 2023-08-16 ENCOUNTER — TELEPHONE (OUTPATIENT)
Dept: INTERNAL MEDICINE | Facility: CLINIC | Age: 78
End: 2023-08-16
Payer: MEDICARE

## 2023-08-16 NOTE — TELEPHONE ENCOUNTER
Caller: Christina Guillen    Relationship: Self    Best call back number: [phone]    What medications are you currently taking:   Current Outpatient Medications on File Prior to Visit   Medication Sig Dispense Refill    amitriptyline (ELAVIL) 25 MG tablet Take 1 tablet by mouth Every Night. 30 tablet 1    amLODIPine (NORVASC) 5 MG tablet Take 1 tablet by mouth Daily. 90 tablet 0    aspirin 81 MG EC tablet Take 1 tablet by mouth Daily.      atenolol (TENORMIN) 50 MG tablet Take 1 tablet by mouth Daily. 90 tablet 0    capsaicin (ZOSTRIX) 0.075 % topical cream Apply to affected area up to 4x/ day. 120 g 1    Cranberry 125 MG tablet Take  by mouth.      gabapentin (NEURONTIN) 300 MG capsule Take 1 tablet PO twice daily for 3 days, then once daily x3 days, then once daily every other day x3 doses then discontinue. 12 capsule 0    pravastatin (PRAVACHOL) 20 MG tablet Take 1 tablet by mouth Daily. 90 tablet 0    Synthroid 50 MCG tablet Take 1 tablet by mouth Daily. 90 tablet 3     No current facility-administered medications on file prior to visit.          Which medication are you concerned about: SYNTHROID 50MCG    Who prescribed you this medication: DEBBIE SANTIAGO    What are your concerns: PATIENT STATES SHE CAN ONLY TAKE THE NAME BRAND OF MEDICATION BECAUSE SHE IS ALLERGIC TO THE GENERIC SHE GETS BUMPS ALL OVER WHEN SHE TAKES GENERIC VERSION. [facility] WILL NOT FILL UNTIL THEY HEAR BACK FROM PROVIDER. PATIENT HAS 1 PILL LEFT. PLEASE CALL [facility] [phone]   PLEASE ADVISE

## 2023-08-29 DIAGNOSIS — E78.5 HYPERLIPIDEMIA, UNSPECIFIED HYPERLIPIDEMIA TYPE: ICD-10-CM

## 2023-08-29 DIAGNOSIS — I10 HYPERTENSION, UNSPECIFIED TYPE: ICD-10-CM

## 2023-08-29 RX ORDER — ATENOLOL 50 MG/1
50 TABLET ORAL DAILY
Qty: 90 TABLET | Refills: 0 | Status: SHIPPED | OUTPATIENT
Start: 2023-08-29

## 2023-08-29 RX ORDER — PRAVASTATIN SODIUM 20 MG
20 TABLET ORAL DAILY
Qty: 90 TABLET | Refills: 0 | Status: SHIPPED | OUTPATIENT
Start: 2023-08-29

## 2023-08-29 NOTE — TELEPHONE ENCOUNTER
Caller: Mindy Christina TASHI    Relationship: Self    Best call back number: [phone]     Requested Prescriptions:   Requested Prescriptions     Pending Prescriptions Disp Refills    atenolol (TENORMIN) 50 MG tablet 90 tablet 0     Sig: Take 1 tablet by mouth Daily.    pravastatin (PRAVACHOL) 20 MG tablet 90 tablet 0     Sig: Take 1 tablet by mouth Daily.        Pharmacy where request should be sent: [address] [phone] [facility] [phone]      Last office visit with prescribing clinician: 4/11/2023   Last telemedicine visit with prescribing clinician: Visit date not found   Next office visit with prescribing clinician: 10/10/2023     Additional details provided by patient: COMPLETELY OUT. REQUEST 90 DAY SUPPLY    Does the patient have less than a 3 day supply:  [x] Yes  [] No    Would you like a call back once the refill request has been completed: [] Yes [x] No    If the office needs to give you a call back, can they leave a voicemail: [] Yes [x] No    Socorro Escobedo Rep   08/29/23 10:18 EDT

## 2023-09-01 DIAGNOSIS — I10 HYPERTENSION, UNSPECIFIED TYPE: ICD-10-CM

## 2023-09-01 RX ORDER — AMLODIPINE BESYLATE 5 MG/1
5 TABLET ORAL DAILY
Qty: 90 TABLET | Refills: 0 | Status: SHIPPED | OUTPATIENT
Start: 2023-09-01

## 2023-09-01 NOTE — TELEPHONE ENCOUNTER
Caller: MindyChristina    Relationship: Self    Best call back number: [phone]  Requested Prescriptions:   Requested Prescriptions     Pending Prescriptions Disp Refills    amLODIPine (NORVASC) 5 MG tablet 90 tablet 0     Sig: Take 1 tablet by mouth Daily.        Pharmacy where request should be sent: [address] - [phone] [facility] [phone] FX     Last office visit with prescribing clinician: 4/11/2023   Last telemedicine visit with prescribing clinician: Visit date not found   Next office visit with prescribing clinician: 10/10/2023     Additional details provided by patient: PATIENT STATES THAT SHE HAS THREE DAYS LEFT     Does the patient have less than a 3 day supply:  [] Yes  [x] No    Would you like a call back once the refill request has been completed: [] Yes [x] No    If the office needs to give you a call back, can they leave a voicemail: [] Yes [x] No    Socorro Mayer   09/01/23 13:07 EDT

## 2023-10-10 ENCOUNTER — OFFICE VISIT (OUTPATIENT)
Dept: INTERNAL MEDICINE | Facility: CLINIC | Age: 78
End: 2023-10-10
Payer: MEDICARE

## 2023-10-10 ENCOUNTER — LAB (OUTPATIENT)
Dept: LAB | Facility: HOSPITAL | Age: 78
End: 2023-10-10
Payer: MEDICARE

## 2023-10-10 VITALS
BODY MASS INDEX: 25.95 KG/M2 | HEIGHT: 64 IN | HEART RATE: 52 BPM | OXYGEN SATURATION: 97 % | SYSTOLIC BLOOD PRESSURE: 120 MMHG | WEIGHT: 152 LBS | TEMPERATURE: 96.8 F | DIASTOLIC BLOOD PRESSURE: 76 MMHG | RESPIRATION RATE: 16 BRPM

## 2023-10-10 DIAGNOSIS — R30.0 DYSURIA: Primary | ICD-10-CM

## 2023-10-10 DIAGNOSIS — N28.9 KIDNEY DYSFUNCTION: ICD-10-CM

## 2023-10-10 DIAGNOSIS — B02.29 POST HERPETIC NEURALGIA: ICD-10-CM

## 2023-10-10 DIAGNOSIS — R30.0 DYSURIA: ICD-10-CM

## 2023-10-10 DIAGNOSIS — I10 HYPERTENSION, UNSPECIFIED TYPE: ICD-10-CM

## 2023-10-10 DIAGNOSIS — Z13.820 SCREENING FOR OSTEOPOROSIS: ICD-10-CM

## 2023-10-10 DIAGNOSIS — E03.9 HYPOTHYROIDISM, UNSPECIFIED TYPE: ICD-10-CM

## 2023-10-10 DIAGNOSIS — E78.5 HYPERLIPIDEMIA, UNSPECIFIED HYPERLIPIDEMIA TYPE: ICD-10-CM

## 2023-10-10 DIAGNOSIS — Z78.0 POST-MENOPAUSAL: ICD-10-CM

## 2023-10-10 LAB
BACTERIA UR QL AUTO: ABNORMAL /HPF
BILIRUB UR QL STRIP: NEGATIVE
CLARITY UR: CLEAR
COLOR UR: YELLOW
GLUCOSE UR STRIP-MCNC: NEGATIVE MG/DL
HGB UR QL STRIP.AUTO: NEGATIVE
HYALINE CASTS UR QL AUTO: ABNORMAL /LPF
KETONES UR QL STRIP: NEGATIVE
LEUKOCYTE ESTERASE UR QL STRIP.AUTO: ABNORMAL
NITRITE UR QL STRIP: POSITIVE
PH UR STRIP.AUTO: 6.5 [PH] (ref 5–8)
PROT UR QL STRIP: NEGATIVE
RBC # UR STRIP: ABNORMAL /HPF
REF LAB TEST METHOD: ABNORMAL
SP GR UR STRIP: 1.01 (ref 1–1.03)
SQUAMOUS #/AREA URNS HPF: ABNORMAL /HPF
UROBILINOGEN UR QL STRIP: ABNORMAL
WBC # UR STRIP: ABNORMAL /HPF

## 2023-10-10 PROCEDURE — 80048 BASIC METABOLIC PNL TOTAL CA: CPT

## 2023-10-10 PROCEDURE — 87186 SC STD MICRODIL/AGAR DIL: CPT

## 2023-10-10 PROCEDURE — 3078F DIAST BP <80 MM HG: CPT | Performed by: NURSE PRACTITIONER

## 2023-10-10 PROCEDURE — 36415 COLL VENOUS BLD VENIPUNCTURE: CPT

## 2023-10-10 PROCEDURE — 81001 URINALYSIS AUTO W/SCOPE: CPT

## 2023-10-10 PROCEDURE — 87088 URINE BACTERIA CULTURE: CPT

## 2023-10-10 PROCEDURE — 99214 OFFICE O/P EST MOD 30 MIN: CPT | Performed by: NURSE PRACTITIONER

## 2023-10-10 PROCEDURE — 3074F SYST BP LT 130 MM HG: CPT | Performed by: NURSE PRACTITIONER

## 2023-10-10 PROCEDURE — 87086 URINE CULTURE/COLONY COUNT: CPT

## 2023-10-10 NOTE — PROGRESS NOTES
"     Follow Up Office Visit      Date: 10/10/2023   Patient Name: Christina Guillen  : 1945   MRN: [id number]     Chief Complaint:    Chief Complaint   Patient presents with    Hypothyroidism     Follow up       History of Present Illness: Christina Guillen is a 78 y.o. female who is here today to follow up.     Postherpetic neuralgia  Reports she still has shingles.   Symptoms worsened due to stress when her daughter moved back home in 2023.     Bilateral knee pain  Patient went to physical therapy for 3 to 4 months for her arm, but it caused pain in her bilateral knees due to certain exercises such as walking backwards on the treadmill.  Recently had cortisone injections that were ineffective.   Reports stretching for relief.     Subjective        I have reviewed the patients family history, social history, past medical history, past surgical history and have updated it as appropriate.     Medications:     Current Outpatient Medications:     amLODIPine (NORVASC) 5 MG tablet, Take 1 tablet by mouth Daily., Disp: 90 tablet, Rfl: 0    aspirin 81 MG EC tablet, Take 1 tablet by mouth Daily., Disp: , Rfl:     atenolol (TENORMIN) 50 MG tablet, Take 1 tablet by mouth Daily., Disp: 90 tablet, Rfl: 0    Cranberry 125 MG tablet, Take  by mouth., Disp: , Rfl:     pravastatin (PRAVACHOL) 20 MG tablet, Take 1 tablet by mouth Daily., Disp: 90 tablet, Rfl: 0    Synthroid 50 MCG tablet, Take 1 tablet by mouth Daily., Disp: 90 tablet, Rfl: 3    Allergies:   Allergies   Allergen Reactions    Levofloxacin Hives    Levothyroxine Sodium Rash    Erythromycin Rash    Macrobid [Nitrofurantoin] Nausea Only    Penicillins Rash    Septra [Sulfamethoxazole-Trimethoprim] Rash       Objective     Physical Exam: Please see above  Vital Signs:   Vitals:    10/10/23 0920   BP: 120/76   Pulse: 52   Resp: 16   Temp: 96.8 °F (36 °C)   SpO2: 97%   Weight: 68.9 kg (152 lb)   Height: 162.6 cm (64\")   PainSc: 0-No pain     Body mass index is 26.09 " kg/m².    Physical Exam  Vitals and nursing note reviewed.   Constitutional:       General: She is not in acute distress.     Appearance: Normal appearance.   HENT:      Head: Normocephalic and atraumatic.   Neck:      Thyroid: No thyromegaly.   Cardiovascular:      Rate and Rhythm: Normal rate and regular rhythm.      Heart sounds: Normal heart sounds.   Pulmonary:      Effort: Pulmonary effort is normal. No respiratory distress.      Breath sounds: Normal breath sounds.   Skin:     General: Skin is warm and dry.      Capillary Refill: Capillary refill takes less than 2 seconds.   Neurological:      General: No focal deficit present.      Mental Status: She is alert and oriented to person, place, and time. Mental status is at baseline.      Motor: No weakness.   Psychiatric:         Mood and Affect: Mood normal.         Behavior: Behavior normal.         Thought Content: Thought content normal.         Judgment: Judgment normal.             Results:   Imaging:     Labs:        Assessment / Plan      Assessment/Plan:   Diagnoses and all orders for this visit:    1. Dysuria   -continue cranberry   -     Urine Culture - , Urine, Clean Catch; Future  -     Cancel: POC Urinalysis Dipstick    2. Kidney dysfunction  -     Basic metabolic panel; Future  -     Urinalysis With Microscopic - Urine, Clean Catch; Future    3. Post-menopausal  4. Screening for osteoporosis  -     DEXA Bone Density Axial; Future    5. Post herpetic neuralgia  -still symptomatic, but declines alternative therapy     6. Hypothyroidism, unspecified type  -continue synthroid 50mcg qd    7. Hypertension, unspecified type  -continue amlodipine 5mg and atenolol 50mg qd    8. Hyperlipidemia, unspecified hyperlipidemia type  -continue pravastatin 20qd, continue 81mg ASA       Follow Up:   Return in about 6 months (around 4/10/2024) for Medicare Subsequent.    RUBINA Manning  [facility]ki     Transcribed from ambient dictation  for Macie Lynn, APRN by Blair Melo.  10/10/23   11:15 EDT    Patient or patient representative verbalized consent to the visit recording.  I have personally performed the services described in this document as transcribed by the above individual, and it is both accurate and complete.

## 2023-10-11 LAB
ANION GAP SERPL CALCULATED.3IONS-SCNC: 10 MMOL/L (ref 5–15)
BUN SERPL-MCNC: 16 MG/DL (ref 8–23)
BUN/CREAT SERPL: 14.7 (ref 7–25)
CALCIUM SPEC-SCNC: 9.7 MG/DL (ref 8.6–10.5)
CHLORIDE SERPL-SCNC: 105 MMOL/L (ref 98–107)
CO2 SERPL-SCNC: 26 MMOL/L (ref 22–29)
CREAT SERPL-MCNC: 1.09 MG/DL (ref 0.57–1)
EGFRCR SERPLBLD CKD-EPI 2021: 51.8 ML/MIN/1.73
GLUCOSE SERPL-MCNC: 86 MG/DL (ref 65–99)
POTASSIUM SERPL-SCNC: 4.3 MMOL/L (ref 3.5–5.2)
SODIUM SERPL-SCNC: 141 MMOL/L (ref 136–145)

## 2023-10-12 LAB — BACTERIA SPEC AEROBE CULT: ABNORMAL

## 2023-10-17 DIAGNOSIS — N30.00 ACUTE CYSTITIS WITHOUT HEMATURIA: Primary | ICD-10-CM

## 2023-10-17 RX ORDER — CEFDINIR 300 MG/1
300 CAPSULE ORAL 2 TIMES DAILY
Qty: 10 CAPSULE | Refills: 0 | Status: SHIPPED | OUTPATIENT
Start: 2023-10-17 | End: 2023-10-22

## 2023-11-14 ENCOUNTER — TELEPHONE (OUTPATIENT)
Dept: INTERNAL MEDICINE | Facility: CLINIC | Age: 78
End: 2023-11-14

## 2023-11-14 NOTE — TELEPHONE ENCOUNTER
Caller: Hima Guillen    Relationship: Emergency Contact    Best call back number: [phone]    What is the best time to reach you: ANYTIME    Who are you requesting to speak with (clinical staff, provider,  specific staff member): PROVIDER    Do you know the name of the person who called:     What was the call regarding:  STATES THAT PATIENT IS SICK WITH COUGH, CONGESTION, SLEEPING, HASN'T EATEN  ALL DAY AND WOULD LIKE TO BE ADVISED. PLEASE ADVISE    Is it okay if the provider responds through MyChart: NO

## 2023-11-15 ENCOUNTER — OFFICE VISIT (OUTPATIENT)
Dept: INTERNAL MEDICINE | Facility: CLINIC | Age: 78
End: 2023-11-15
Payer: MEDICARE

## 2023-11-15 VITALS
SYSTOLIC BLOOD PRESSURE: 122 MMHG | HEART RATE: 66 BPM | HEIGHT: 64 IN | WEIGHT: 151 LBS | DIASTOLIC BLOOD PRESSURE: 74 MMHG | TEMPERATURE: 97.9 F | BODY MASS INDEX: 25.78 KG/M2 | OXYGEN SATURATION: 97 %

## 2023-11-15 DIAGNOSIS — U07.1 COVID: Primary | ICD-10-CM

## 2023-11-15 DIAGNOSIS — R05.9 COUGH IN ADULT: ICD-10-CM

## 2023-11-15 DIAGNOSIS — R53.83 FATIGUE, UNSPECIFIED TYPE: ICD-10-CM

## 2023-11-15 DIAGNOSIS — R09.81 NASAL CONGESTION: ICD-10-CM

## 2023-11-15 DIAGNOSIS — R50.9 FEVER, UNSPECIFIED FEVER CAUSE: ICD-10-CM

## 2023-11-15 LAB
EXPIRATION DATE: ABNORMAL
FLUAV AG UPPER RESP QL IA.RAPID: NOT DETECTED
FLUBV AG UPPER RESP QL IA.RAPID: NOT DETECTED
INTERNAL CONTROL: ABNORMAL
Lab: ABNORMAL
SARS-COV-2 AG UPPER RESP QL IA.RAPID: DETECTED

## 2023-11-15 RX ORDER — BENZONATATE 100 MG/1
100 CAPSULE ORAL 3 TIMES DAILY PRN
Qty: 20 CAPSULE | Refills: 0 | Status: SHIPPED | OUTPATIENT
Start: 2023-11-15

## 2023-11-15 NOTE — PROGRESS NOTES
Office Note     Name: Christina Guillen    : 1945     MRN: [id number]     Chief Complaint  Cough (Sx started about 4 days ago. Has used tylenol and vitamin c to help with sx. ), Fatigue, Nasal Congestion, and Fever (Low grade fever this morning of 99 degress. Yesterday was 102 degrees )    Subjective     History of Present Illness:  Christina Guillen is a 78 y.o. female who presents today for complaints of upper respiratory symptoms.  Patient reports onset of symptoms was Monday night.  She complains of cough, sinus drainage, fever, fatigue, increased daytime somnolence, and poor appetite.  Patient describes the cough as nonproductive but notes some yellow-colored sinus drainage.  She reports her fever has been as high as 102 °F.  She has not been eating well over the last few days.  She also has been sleeping much more since Monday evening.  She has been taking Tylenol, allergy medication, and vitamin C at home with no relief in symptoms.  She follows with [facility] for chronic conditions.  She presents today for evaluation of the above acute complaints.  She denies further complaints or concerns at this time.  Pleasant visit with the patient and her spouse today.      Past Medical History:   Diagnosis Date    Arthritis 2020    Breast cancer 1975    right     Cancer     Cataract 2005    Disease of thyroid gland     Fracture of ankle 2020    Hyperlipidemia ??    Hypertension     Hypothyroidism ??    Knee swelling 2021    Urinary tract infection        Past Surgical History:   Procedure Laterality Date    BREAST BIOPSY Right     COLONOSCOPY      No polyps    EYE SURGERY      Detached retina    MASTECTOMY Right 1975       Social History     Socioeconomic History    Marital status:    Tobacco Use    Smoking status: Never    Smokeless tobacco: Never   Vaping Use    Vaping Use: Never used   Substance and Sexual Activity    Alcohol use: Never    Drug use: Never    Sexual activity: Not  "Currently     Partners: Male         Current Outpatient Medications:     amLODIPine (NORVASC) 5 MG tablet, Take 1 tablet by mouth Daily., Disp: 90 tablet, Rfl: 0    aspirin 81 MG EC tablet, Take 1 tablet by mouth Daily., Disp: , Rfl:     atenolol (TENORMIN) 50 MG tablet, Take 1 tablet by mouth Daily., Disp: 90 tablet, Rfl: 0    Cranberry 125 MG tablet, Take  by mouth., Disp: , Rfl:     pravastatin (PRAVACHOL) 20 MG tablet, Take 1 tablet by mouth Daily., Disp: 90 tablet, Rfl: 0    Synthroid 50 MCG tablet, Take 1 tablet by mouth Daily., Disp: 90 tablet, Rfl: 3    benzonatate (Tessalon Perles) 100 MG capsule, Take 1 capsule by mouth 3 (Three) Times a Day As Needed for Cough., Disp: 20 capsule, Rfl: 0    Nirmatrelvir&Ritonavir 300/100 (PAXLOVID) 20 x 150 MG & 10 x 100MG tablet therapy pack tablet, Take 3 tablets by mouth 2 (Two) Times a Day., Disp: 30 each, Rfl: 0    Objective     Vital Signs  /74   Pulse 66   Temp 97.9 °F (36.6 °C)   Ht 162.6 cm (64.02\")   Wt 68.5 kg (151 lb)   SpO2 97%   BMI 25.91 kg/m²   Estimated body mass index is 25.91 kg/m² as calculated from the following:    Height as of this encounter: 162.6 cm (64.02\").    Weight as of this encounter: 68.5 kg (151 lb).           Physical Exam  Constitutional:       General: She is not in acute distress.     Appearance: Normal appearance. She is ill-appearing.   HENT:      Head: Normocephalic and atraumatic.      Nose: Nose normal.   Eyes:      Extraocular Movements: Extraocular movements intact.      Conjunctiva/sclera: Conjunctivae normal.      Pupils: Pupils are equal, round, and reactive to light.   Cardiovascular:      Rate and Rhythm: Normal rate and regular rhythm.      Heart sounds: Normal heart sounds.   Pulmonary:      Effort: Pulmonary effort is normal. No respiratory distress.      Breath sounds: Normal breath sounds.   Musculoskeletal:         General: Normal range of motion.      Cervical back: Neck supple.   Skin:     General: Skin " is warm and dry.   Neurological:      General: No focal deficit present.      Mental Status: She is alert and oriented to person, place, and time. Mental status is at baseline.   Psychiatric:         Mood and Affect: Mood normal.         Behavior: Behavior normal.         Thought Content: Thought content normal.         Judgment: Judgment normal.          Assessment and Plan     Diagnoses and all orders for this visit:    1. COVID (Primary)  -     Nirmatrelvir&Ritonavir 300/100 (PAXLOVID) 20 x 150 MG & 10 x 100MG tablet therapy pack tablet; Take 3 tablets by mouth 2 (Two) Times a Day.  Dispense: 30 each; Refill: 0    2. Cough in adult  -     POCT SARS-CoV-2 Antigen HARLAN + Flu  -     benzonatate (Tessalon Perles) 100 MG capsule; Take 1 capsule by mouth 3 (Three) Times a Day As Needed for Cough.  Dispense: 20 capsule; Refill: 0    3. Nasal congestion  -     POCT SARS-CoV-2 Antigen HARLAN + Flu    4. Fever, unspecified fever cause    5. Fatigue, unspecified type    Plan:  Patient is COVID-positive.  Patient is unsure if she wants to take Paxlovid or not.  Prescription sent to the pharmacy on file.  Patient instructed she must start the medication within the 5-day symptom onset window.  Patient verbalized understanding.  Advised to start Mucinex twice daily for the next 5 to 7 days.  Patient reports she has Mucinex at home.  Patient instructed to hold her pravastatin if she decides to start Paxlovid.  Follow-up if symptoms worsen or fail to improve.    Follow Up  Return if symptoms worsen or fail to improve, for Follow up with RUBINA Belcher    Part of this note may be an electronic transcription/translation of spoken language to printed text using the Dragon Dictation System.

## 2023-11-17 ENCOUNTER — OFFICE VISIT (OUTPATIENT)
Dept: INTERNAL MEDICINE | Facility: CLINIC | Age: 78
End: 2023-11-17
Payer: MEDICARE

## 2023-11-17 VITALS
OXYGEN SATURATION: 96 % | DIASTOLIC BLOOD PRESSURE: 62 MMHG | HEIGHT: 64 IN | SYSTOLIC BLOOD PRESSURE: 116 MMHG | WEIGHT: 151 LBS | HEART RATE: 62 BPM | BODY MASS INDEX: 25.78 KG/M2 | TEMPERATURE: 97.8 F

## 2023-11-17 DIAGNOSIS — U07.1 COVID-19: Primary | ICD-10-CM

## 2023-11-17 PROCEDURE — 99213 OFFICE O/P EST LOW 20 MIN: CPT | Performed by: NURSE PRACTITIONER

## 2023-11-17 PROCEDURE — 3074F SYST BP LT 130 MM HG: CPT | Performed by: NURSE PRACTITIONER

## 2023-11-17 PROCEDURE — 1159F MED LIST DOCD IN RCRD: CPT | Performed by: NURSE PRACTITIONER

## 2023-11-17 PROCEDURE — 3078F DIAST BP <80 MM HG: CPT | Performed by: NURSE PRACTITIONER

## 2023-11-17 PROCEDURE — 1160F RVW MEDS BY RX/DR IN RCRD: CPT | Performed by: NURSE PRACTITIONER

## 2023-11-17 RX ORDER — PSEUDOEPHEDRINE HCL 30 MG
30 TABLET ORAL EVERY 4 HOURS PRN
COMMUNITY

## 2023-11-17 NOTE — PROGRESS NOTES
"Chief Complaint  COVID-19 (Was prescribed PAXLOVID 11/15/23 and she has not taken it)    Subjective      History of Present Illness  Christina is a 78 y.o. female who presents to the clinic today for evaluation of nonproductive cough. Symptoms began 4 days ago. Symptoms have been gradually improving since that time. Past history is significant for nothing.  She was seen in the office 2 days ago for cough, fatigue, nasal congestion, and fever.  She was diagnosed with COVID-19 and given Paxlovid and benzonatate.  She did not start taking the Paxlovid because \"she does not know enough about the medication.\"  She has not been vaccinated.  She says she has had COVID before.    The following portions of the patient's history were reviewed and updated as appropriate: allergies, current medications, past family history, past medical history, past social history, past surgical history, and problem list.    Review of Systems  Pertinent items are noted in HPI.       Objective   Vital Signs:  /62   Pulse 62   Temp 97.8 °F (36.6 °C) (Infrared)   Ht 162.6 cm (64\")   Wt 68.5 kg (151 lb)   SpO2 96%   BMI 25.92 kg/m²   Estimated body mass index is 25.92 kg/m² as calculated from the following:    Height as of this encounter: 162.6 cm (64\").    Weight as of this encounter: 68.5 kg (151 lb).            Physical Exam  Vitals reviewed.   Constitutional:       General: She is not in acute distress.  HENT:      Head: Normocephalic and atraumatic.      Nose: Nose normal.      Mouth/Throat:      Mouth: Mucous membranes are moist.      Pharynx: Oropharynx is clear.   Eyes:      Conjunctiva/sclera: Conjunctivae normal.   Cardiovascular:      Rate and Rhythm: Normal rate and regular rhythm.      Pulses: Normal pulses.      Heart sounds: Normal heart sounds.   Pulmonary:      Effort: Pulmonary effort is normal.      Breath sounds: Normal breath sounds.   Musculoskeletal:      Cervical back: Normal range of motion and neck supple. "   Skin:     General: Skin is warm and dry.   Neurological:      General: No focal deficit present.      Mental Status: She is alert.   Psychiatric:         Mood and Affect: Mood normal.         Behavior: Behavior normal.         Thought Content: Thought content normal.         Judgment: Judgment normal.          Result Review   The following data was reviewed by: RUBINA Kingsley on 11/17/2023:     Latest Reference Range & Units 11/15/23 14:02   SARS Antigen Not Detected, Presumptive Negative  Detected !   !: Data is abnormal                 Assessment and Plan  Diagnoses and all orders for this visit:    1. COVID-19 (Primary)  Assessment & Plan:  She has been using OTC antihistamines and decongestants for symptoms, she says she is feeling better.  She can continue to use those as needed for symptoms.  Paxlovid discontinued, patient uncomfortable taking it.  Isolation discussed, she continues to feel better may go into public with mask on for the next week.  Follow-up as needed.                 Follow Up  Return if symptoms worsen or fail to improve.  Patient was given instructions and counseling regarding her condition or for health maintenance advice. Please see specific information pulled into the AVS if appropriate.    Part of this note may be an electronic transcription/translation of spoken language to printed text using the Dragon Dictation System.

## 2023-11-28 DIAGNOSIS — E78.5 HYPERLIPIDEMIA, UNSPECIFIED HYPERLIPIDEMIA TYPE: ICD-10-CM

## 2023-11-28 DIAGNOSIS — I10 HYPERTENSION, UNSPECIFIED TYPE: ICD-10-CM

## 2023-11-28 RX ORDER — AMLODIPINE BESYLATE 5 MG/1
5 TABLET ORAL DAILY
Qty: 90 TABLET | Refills: 0 | Status: SHIPPED | OUTPATIENT
Start: 2023-11-28

## 2023-11-28 RX ORDER — PRAVASTATIN SODIUM 20 MG
20 TABLET ORAL DAILY
Qty: 90 TABLET | Refills: 0 | Status: SHIPPED | OUTPATIENT
Start: 2023-11-28

## 2023-11-28 RX ORDER — ATENOLOL 50 MG/1
50 TABLET ORAL DAILY
Qty: 90 TABLET | Refills: 0 | Status: SHIPPED | OUTPATIENT
Start: 2023-11-28

## 2024-02-26 DIAGNOSIS — E78.5 HYPERLIPIDEMIA, UNSPECIFIED HYPERLIPIDEMIA TYPE: ICD-10-CM

## 2024-02-26 DIAGNOSIS — I10 HYPERTENSION, UNSPECIFIED TYPE: ICD-10-CM

## 2024-02-26 RX ORDER — ATENOLOL 50 MG/1
50 TABLET ORAL DAILY
Qty: 90 TABLET | Refills: 0 | OUTPATIENT
Start: 2024-02-26

## 2024-02-26 RX ORDER — AMLODIPINE BESYLATE 5 MG/1
5 TABLET ORAL DAILY
Qty: 90 TABLET | Refills: 0 | OUTPATIENT
Start: 2024-02-26

## 2024-02-26 RX ORDER — PRAVASTATIN SODIUM 20 MG
20 TABLET ORAL DAILY
Qty: 90 TABLET | Refills: 0 | OUTPATIENT
Start: 2024-02-26

## 2024-02-26 NOTE — TELEPHONE ENCOUNTER
Caller: MindyChristina    Relationship: Self    Best call back number: [phone]    Requested Prescriptions:   Requested Prescriptions     Pending Prescriptions Disp Refills    amLODIPine (NORVASC) 5 MG tablet 90 tablet 0     Sig: Take 1 tablet by mouth Daily.    atenolol (TENORMIN) 50 MG tablet 90 tablet 0     Sig: Take 1 tablet by mouth Daily.    pravastatin (PRAVACHOL) 20 MG tablet 90 tablet 0     Sig: Take 1 tablet by mouth Daily.        Pharmacy where request should be sent: [address] [phone] [facility] [phone] [facility]     Last office visit with prescribing clinician: 10/10/2023   Last telemedicine visit with prescribing clinician: Visit date not found   Next office visit with prescribing clinician: Visit date not found     Additional details provided by patient:     Does the patient have less than a 3 day supply:  [] Yes  [x] No    Would you like a call back once the refill request has been completed: [] Yes [x] No    If the office needs to give you a call back, can they leave a voicemail: [] Yes [x] No    Socorro Santos Rep   02/26/24 09:11 EST

## 2024-02-29 DIAGNOSIS — I10 HYPERTENSION, UNSPECIFIED TYPE: ICD-10-CM

## 2024-02-29 DIAGNOSIS — E78.5 HYPERLIPIDEMIA, UNSPECIFIED HYPERLIPIDEMIA TYPE: ICD-10-CM

## 2024-02-29 RX ORDER — AMLODIPINE BESYLATE 5 MG/1
5 TABLET ORAL DAILY
Qty: 30 TABLET | Refills: 0 | Status: SHIPPED | OUTPATIENT
Start: 2024-02-29 | End: 2024-03-04 | Stop reason: SDUPTHER

## 2024-02-29 RX ORDER — ATENOLOL 50 MG/1
50 TABLET ORAL DAILY
Qty: 30 TABLET | Refills: 0 | Status: SHIPPED | OUTPATIENT
Start: 2024-02-29 | End: 2024-03-04 | Stop reason: SDUPTHER

## 2024-02-29 RX ORDER — PRAVASTATIN SODIUM 20 MG
20 TABLET ORAL DAILY
Qty: 30 TABLET | Refills: 0 | Status: SHIPPED | OUTPATIENT
Start: 2024-02-29 | End: 2024-03-04 | Stop reason: SDUPTHER

## 2024-02-29 NOTE — TELEPHONE ENCOUNTER
PATIENT WAS RELAYED DILLON'S MESSAGE ON 2-29-24 BUT WAS NEVER TOLD UNTIL SHE CALLED BACK TODAY THAT SHE NEEDED AN APPT. SO SHE LOST THREE DAYS OF GETTING ESTABLISHED WITH NEW PROVIDER. SHE DECLINED MY OFFER TO MAKE AN EST CARE WITH CAITLYN AND PREFERRED TO SEE RADHIKA.  EST CARE APPT MADE FOR 3-4-24 AND A MED REFILL APPT MADE FOR 2-29-24 AT 4 BUT IF THE MEDS CAN BE REFILLED TODAY WITHOUT ACTUALLY BEING SEEN TODAY, I'LL CALL HER AND CANCEL TODAY'S APPT AND SHE WILL KEEP HER MONDAY APPT.  PLEASE ADVISE. NATALIO CAN BE REACHED [phone]

## 2024-03-04 ENCOUNTER — OFFICE VISIT (OUTPATIENT)
Dept: INTERNAL MEDICINE | Facility: CLINIC | Age: 79
End: 2024-03-04
Payer: MEDICARE

## 2024-03-04 VITALS
BODY MASS INDEX: 25.27 KG/M2 | SYSTOLIC BLOOD PRESSURE: 124 MMHG | TEMPERATURE: 97.5 F | HEIGHT: 64 IN | OXYGEN SATURATION: 100 % | HEART RATE: 67 BPM | DIASTOLIC BLOOD PRESSURE: 64 MMHG | WEIGHT: 148 LBS

## 2024-03-04 DIAGNOSIS — E03.9 ACQUIRED HYPOTHYROIDISM: ICD-10-CM

## 2024-03-04 DIAGNOSIS — Z79.82 LONG-TERM USE OF ASPIRIN THERAPY: ICD-10-CM

## 2024-03-04 DIAGNOSIS — R73.03 PREDIABETES: ICD-10-CM

## 2024-03-04 DIAGNOSIS — I10 PRIMARY HYPERTENSION: ICD-10-CM

## 2024-03-04 DIAGNOSIS — Z90.11 STATUS POST RIGHT MASTECTOMY: ICD-10-CM

## 2024-03-04 DIAGNOSIS — Z28.21 IMMUNIZATION DECLINED: ICD-10-CM

## 2024-03-04 DIAGNOSIS — Z53.20 MAMMOGRAM DECLINED: ICD-10-CM

## 2024-03-04 DIAGNOSIS — Z78.9 NON-SMOKER: ICD-10-CM

## 2024-03-04 DIAGNOSIS — Z79.899 ON STATIN THERAPY: ICD-10-CM

## 2024-03-04 DIAGNOSIS — Z53.20 COLON CANCER SCREENING DECLINED: ICD-10-CM

## 2024-03-04 DIAGNOSIS — Z13.31 DEPRESSION SCREEN: ICD-10-CM

## 2024-03-04 DIAGNOSIS — E78.2 MIXED HYPERLIPIDEMIA: ICD-10-CM

## 2024-03-04 DIAGNOSIS — Z71.3 ENCOUNTER FOR DIETARY COUNSELING AND SURVEILLANCE: ICD-10-CM

## 2024-03-04 DIAGNOSIS — Z85.3 HISTORY OF BREAST CANCER: ICD-10-CM

## 2024-03-04 DIAGNOSIS — E66.3 OVERWEIGHT (BMI 25.0-29.9): ICD-10-CM

## 2024-03-04 DIAGNOSIS — Z76.89 ESTABLISHING CARE WITH NEW DOCTOR, ENCOUNTER FOR: Primary | ICD-10-CM

## 2024-03-04 DIAGNOSIS — N18.31 STAGE 3A CHRONIC KIDNEY DISEASE: ICD-10-CM

## 2024-03-04 DIAGNOSIS — N28.9 KIDNEY DYSFUNCTION: ICD-10-CM

## 2024-03-04 RX ORDER — ATENOLOL 50 MG/1
50 TABLET ORAL DAILY
Qty: 90 TABLET | Refills: 0 | Status: SHIPPED | OUTPATIENT
Start: 2024-03-04

## 2024-03-04 RX ORDER — AMLODIPINE BESYLATE 5 MG/1
5 TABLET ORAL DAILY
Qty: 90 TABLET | Refills: 0 | Status: SHIPPED | OUTPATIENT
Start: 2024-03-04

## 2024-03-04 RX ORDER — PRAVASTATIN SODIUM 20 MG
20 TABLET ORAL DAILY
Qty: 90 TABLET | Refills: 0 | Status: SHIPPED | OUTPATIENT
Start: 2024-03-04

## 2024-03-12 ENCOUNTER — LAB (OUTPATIENT)
Dept: LAB | Facility: HOSPITAL | Age: 79
End: 2024-03-12
Payer: MEDICARE

## 2024-03-12 DIAGNOSIS — R73.03 PREDIABETES: ICD-10-CM

## 2024-03-12 DIAGNOSIS — I10 PRIMARY HYPERTENSION: ICD-10-CM

## 2024-03-12 DIAGNOSIS — E03.9 ACQUIRED HYPOTHYROIDISM: ICD-10-CM

## 2024-03-12 DIAGNOSIS — E78.2 MIXED HYPERLIPIDEMIA: ICD-10-CM

## 2024-03-12 LAB
ALBUMIN SERPL-MCNC: 4 G/DL (ref 3.5–5.2)
ALBUMIN UR-MCNC: 4.1 MG/DL
ALBUMIN/GLOB SERPL: 1.5 G/DL
ALP SERPL-CCNC: 82 U/L (ref 39–117)
ALT SERPL W P-5'-P-CCNC: 9 U/L (ref 1–33)
ANION GAP SERPL CALCULATED.3IONS-SCNC: 9.1 MMOL/L (ref 5–15)
AST SERPL-CCNC: 16 U/L (ref 1–32)
BACTERIA UR QL AUTO: ABNORMAL /HPF
BILIRUB SERPL-MCNC: 0.4 MG/DL (ref 0–1.2)
BILIRUB UR QL STRIP: NEGATIVE
BUN SERPL-MCNC: 18 MG/DL (ref 8–23)
BUN/CREAT SERPL: 16.7 (ref 7–25)
CALCIUM SPEC-SCNC: 9.3 MG/DL (ref 8.6–10.5)
CHLORIDE SERPL-SCNC: 105 MMOL/L (ref 98–107)
CHOLEST SERPL-MCNC: 148 MG/DL (ref 0–200)
CLARITY UR: CLEAR
CO2 SERPL-SCNC: 26.9 MMOL/L (ref 22–29)
COLOR UR: YELLOW
CREAT SERPL-MCNC: 1.08 MG/DL (ref 0.57–1)
DEPRECATED RDW RBC AUTO: 43.2 FL (ref 37–54)
EGFRCR SERPLBLD CKD-EPI 2021: 52.4 ML/MIN/1.73
ERYTHROCYTE [DISTWIDTH] IN BLOOD BY AUTOMATED COUNT: 12.8 % (ref 12.3–15.4)
GLOBULIN UR ELPH-MCNC: 2.7 GM/DL
GLUCOSE SERPL-MCNC: 96 MG/DL (ref 65–99)
GLUCOSE UR STRIP-MCNC: NEGATIVE MG/DL
HBA1C MFR BLD: 5.6 % (ref 4.8–5.6)
HCT VFR BLD AUTO: 36 % (ref 34–46.6)
HDLC SERPL-MCNC: 49 MG/DL (ref 40–60)
HGB BLD-MCNC: 11.8 G/DL (ref 12–15.9)
HGB UR QL STRIP.AUTO: NEGATIVE
HOLD SPECIMEN: NORMAL
HYALINE CASTS UR QL AUTO: ABNORMAL /LPF
KETONES UR QL STRIP: NEGATIVE
LDLC SERPL CALC-MCNC: 81 MG/DL (ref 0–100)
LDLC/HDLC SERPL: 1.62 {RATIO}
LEUKOCYTE ESTERASE UR QL STRIP.AUTO: ABNORMAL
MCH RBC QN AUTO: 30.3 PG (ref 26.6–33)
MCHC RBC AUTO-ENTMCNC: 32.8 G/DL (ref 31.5–35.7)
MCV RBC AUTO: 92.5 FL (ref 79–97)
NITRITE UR QL STRIP: NEGATIVE
PH UR STRIP.AUTO: 5.5 [PH] (ref 5–8)
PLATELET # BLD AUTO: 225 10*3/MM3 (ref 140–450)
PMV BLD AUTO: 12.4 FL (ref 6–12)
POTASSIUM SERPL-SCNC: 4.2 MMOL/L (ref 3.5–5.2)
PROT SERPL-MCNC: 6.7 G/DL (ref 6–8.5)
PROT UR QL STRIP: ABNORMAL
RBC # BLD AUTO: 3.89 10*6/MM3 (ref 3.77–5.28)
RBC # UR STRIP: ABNORMAL /HPF
REF LAB TEST METHOD: ABNORMAL
SODIUM SERPL-SCNC: 141 MMOL/L (ref 136–145)
SP GR UR STRIP: 1.02 (ref 1–1.03)
SQUAMOUS #/AREA URNS HPF: ABNORMAL /HPF
TRIGL SERPL-MCNC: 98 MG/DL (ref 0–150)
TSH SERPL DL<=0.05 MIU/L-ACNC: 3.29 UIU/ML (ref 0.27–4.2)
UROBILINOGEN UR QL STRIP: ABNORMAL
VLDLC SERPL-MCNC: 18 MG/DL (ref 5–40)
WBC # UR STRIP: ABNORMAL /HPF
WBC NRBC COR # BLD AUTO: 7.05 10*3/MM3 (ref 3.4–10.8)

## 2024-03-12 PROCEDURE — 85027 COMPLETE CBC AUTOMATED: CPT

## 2024-03-12 PROCEDURE — 87086 URINE CULTURE/COLONY COUNT: CPT

## 2024-03-12 PROCEDURE — 81001 URINALYSIS AUTO W/SCOPE: CPT

## 2024-03-12 PROCEDURE — 80053 COMPREHEN METABOLIC PANEL: CPT

## 2024-03-12 PROCEDURE — 84443 ASSAY THYROID STIM HORMONE: CPT

## 2024-03-12 PROCEDURE — 83036 HEMOGLOBIN GLYCOSYLATED A1C: CPT

## 2024-03-12 PROCEDURE — 80061 LIPID PANEL: CPT

## 2024-03-12 PROCEDURE — 82043 UR ALBUMIN QUANTITATIVE: CPT

## 2024-03-13 ENCOUNTER — TELEPHONE (OUTPATIENT)
Dept: INTERNAL MEDICINE | Facility: CLINIC | Age: 79
End: 2024-03-13
Payer: MEDICARE

## 2024-03-13 LAB — BACTERIA SPEC AEROBE CULT: NORMAL

## 2024-03-13 NOTE — TELEPHONE ENCOUNTER
Called pt with lab results. Pt voiced understanding and appreciation.     ----- Message from RUBINA Baker sent at 3/13/2024 10:33 AM EDT -----  Urine culture showed no growth of bacteria so no evidence of uti. Continued to stay well hydrated and wipe front to back

## 2024-04-19 ENCOUNTER — OFFICE VISIT (OUTPATIENT)
Dept: INTERNAL MEDICINE | Facility: CLINIC | Age: 79
End: 2024-04-19
Payer: MEDICARE

## 2024-04-19 VITALS
DIASTOLIC BLOOD PRESSURE: 60 MMHG | OXYGEN SATURATION: 98 % | TEMPERATURE: 97.1 F | BODY MASS INDEX: 28.58 KG/M2 | SYSTOLIC BLOOD PRESSURE: 116 MMHG | HEART RATE: 70 BPM | HEIGHT: 64 IN | RESPIRATION RATE: 20 BRPM | WEIGHT: 167.4 LBS

## 2024-04-19 DIAGNOSIS — J06.9 URI WITH COUGH AND CONGESTION: Primary | ICD-10-CM

## 2024-04-19 LAB
EXPIRATION DATE: NORMAL
EXPIRATION DATE: NORMAL
FLUAV AG NPH QL: NEGATIVE
FLUBV AG NPH QL: NEGATIVE
INTERNAL CONTROL: NORMAL
INTERNAL CONTROL: NORMAL
Lab: NORMAL
Lab: NORMAL
SARS-COV-2 AG UPPER RESP QL IA.RAPID: NOT DETECTED

## 2024-04-19 RX ORDER — DOXYCYCLINE HYCLATE 100 MG/1
100 CAPSULE ORAL 2 TIMES DAILY
Qty: 10 CAPSULE | Refills: 0 | Status: SHIPPED | OUTPATIENT
Start: 2024-04-19 | End: 2024-04-24

## 2024-04-19 RX ORDER — BENZONATATE 100 MG/1
100 CAPSULE ORAL 3 TIMES DAILY PRN
Qty: 30 CAPSULE | Refills: 0 | Status: SHIPPED | OUTPATIENT
Start: 2024-04-19 | End: 2024-04-29

## 2024-04-19 NOTE — PROGRESS NOTES
Office Note     Name: Christina Guillen    : 1945     MRN: [id number]     Chief Complaint  Cough, Earache, and URI    Subjective     History of Present Illness:  Christina Guillen is a 79 y.o. female who presents today for acute symptoms    Patient is here today for acute symptoms that started on /Monday.  She and her twin sister celebrated their 80[age] birthday over the weekend.  Patient stated that her sister was sick over the weekend with quite a bit of coughing.  Patient has not had a fevers.  Overall she has cough and congestion as her ear discomfort.  She is only tried 1 allergy pill and states it did not help.      Past Medical History:   Diagnosis Date    Arthritis     Breast cancer 1975    right     Cancer     Cataract 2005    Disease of thyroid gland     Fracture of ankle 2020    Hyperlipidemia ??    Hypertension     Hypothyroidism ??    Knee swelling 2021    Urinary tract infection        Past Surgical History:   Procedure Laterality Date    BREAST BIOPSY Right 1975    COLONOSCOPY      No polyps    EYE SURGERY      Detached retina    MASTECTOMY Right 1975       Social History     Socioeconomic History    Marital status:    Tobacco Use    Smoking status: Never    Smokeless tobacco: Never   Vaping Use    Vaping status: Never Used   Substance and Sexual Activity    Alcohol use: Never    Drug use: Never    Sexual activity: Not Currently     Partners: Male         Current Outpatient Medications:     amLODIPine (NORVASC) 5 MG tablet, Take 1 tablet by mouth Daily., Disp: 90 tablet, Rfl: 0    aspirin 81 MG EC tablet, Take 1 tablet by mouth Daily., Disp: , Rfl:     atenolol (TENORMIN) 50 MG tablet, Take 1 tablet by mouth Daily., Disp: 90 tablet, Rfl: 0    pravastatin (PRAVACHOL) 20 MG tablet, Take 1 tablet by mouth Daily., Disp: 90 tablet, Rfl: 0    Synthroid 50 MCG tablet, Take 1 tablet by mouth Daily., Disp: 90 tablet, Rfl: 3    benzonatate (Tessalon Perles) 100 MG capsule,  "Take 1 capsule by mouth 3 (Three) Times a Day As Needed for Cough for up to 10 days., Disp: 30 capsule, Rfl: 0    doxycycline (VIBRAMYCIN) 100 MG capsule, Take 1 capsule by mouth 2 (Two) Times a Day for 5 days., Disp: 10 capsule, Rfl: 0    Objective     Vital Signs  /60   Pulse 70   Temp 97.1 °F (36.2 °C) (Temporal)   Resp 20   Ht 162.6 cm (64.02\")   Wt 75.9 kg (167 lb 6.4 oz)   SpO2 98%   BMI 28.72 kg/m²   Estimated body mass index is 28.72 kg/m² as calculated from the following:    Height as of this encounter: 162.6 cm (64.02\").    Weight as of this encounter: 75.9 kg (167 lb 6.4 oz).             Physical Exam  Vitals and nursing note reviewed.   Constitutional:       General: She is awake.      Appearance: Normal appearance. She is well-groomed.   HENT:      Head: Normocephalic and atraumatic.      Right Ear: Hearing, tympanic membrane, ear canal and external ear normal.      Left Ear: Hearing, tympanic membrane, ear canal and external ear normal.      Nose: Nose normal.      Right Sinus: No maxillary sinus tenderness or frontal sinus tenderness.      Left Sinus: No maxillary sinus tenderness or frontal sinus tenderness.      Mouth/Throat:      Lips: Pink.      Mouth: Mucous membranes are moist.      Pharynx: Oropharynx is clear.   Eyes:      Extraocular Movements: Extraocular movements intact.      Pupils: Pupils are equal, round, and reactive to light.   Cardiovascular:      Rate and Rhythm: Normal rate and regular rhythm.      Heart sounds: Normal heart sounds.   Pulmonary:      Effort: Pulmonary effort is normal.      Breath sounds: Normal breath sounds.   Chest:      Comments: Dry cough noted on exam  Musculoskeletal:         General: Normal range of motion.   Lymphadenopathy:      Cervical: No cervical adenopathy.   Skin:     General: Skin is warm and dry.   Neurological:      Mental Status: She is alert and oriented to person, place, and time.   Psychiatric:         Mood and Affect: Mood " normal.         Behavior: Behavior normal. Behavior is cooperative.          Lab Review:     Latest Reference Range & Units 04/19/24 14:19   SARS Antigen Not Detected, Presumptive Negative  Not Detected   Rapid Influenza A Ag Negative  Negative   Rapid Influenza B Ag Negative  Negative   Expiration Date  11-10-25  07-11-24   Lot Number  [id number]  [id number]          Assessment and Plan     Diagnoses and all orders for this visit:    1. URI with cough and congestion (Primary)  -     POCT SARS-CoV-2 Antigen HARLAN  -     POCT Influenza A/B  -     benzonatate (Tessalon Perles) 100 MG capsule; Take 1 capsule by mouth 3 (Three) Times a Day As Needed for Cough for up to 10 days.  Dispense: 30 capsule; Refill: 0  -     doxycycline (VIBRAMYCIN) 100 MG capsule; Take 1 capsule by mouth 2 (Two) Times a Day for 5 days.  Dispense: 10 capsule; Refill: 0    Plan  Discussed results of in office testing with patient today  Additional medication sent to pharmacy for patient  Patient does not prefer cough syrups as she states they make her sick.  Encourage patient to continue over-the-counter medication management.  Continue coughing and deep breathing.  Continue to stay well-hydrated.  Discussed with patient that I do not feel that antibiotic therapy is necessary.  Lungs were clear throughout.  Negative physical exam.  Will hold on chest x-ray.  Antibiotics will be sent in to pharmacy for patient appreciation of preference.  Plan to follow-up as scheduled  Go to ER if any condition worsens or severe    Follow Up  Return for Next scheduled follow up.    RUBINA Baker    Part of this note may be an electronic transcription/translation of spoken language to printed text using the Dragon Dictation System.

## 2024-05-21 ENCOUNTER — OFFICE VISIT (OUTPATIENT)
Dept: INTERNAL MEDICINE | Facility: CLINIC | Age: 79
End: 2024-05-21
Payer: MEDICARE

## 2024-05-21 VITALS
HEIGHT: 64 IN | WEIGHT: 147.8 LBS | BODY MASS INDEX: 25.23 KG/M2 | SYSTOLIC BLOOD PRESSURE: 110 MMHG | HEART RATE: 58 BPM | DIASTOLIC BLOOD PRESSURE: 52 MMHG | TEMPERATURE: 97 F | OXYGEN SATURATION: 100 % | RESPIRATION RATE: 18 BRPM

## 2024-05-21 DIAGNOSIS — Z00.00 ENCOUNTER FOR WELL ADULT EXAM WITHOUT ABNORMAL FINDINGS: ICD-10-CM

## 2024-05-21 DIAGNOSIS — E66.3 OVERWEIGHT (BMI 25.0-29.9): ICD-10-CM

## 2024-05-21 DIAGNOSIS — Z90.11 STATUS POST RIGHT MASTECTOMY: ICD-10-CM

## 2024-05-21 DIAGNOSIS — Z78.9 NON-SMOKER: ICD-10-CM

## 2024-05-21 DIAGNOSIS — Z00.00 ENCOUNTER FOR SUBSEQUENT ANNUAL WELLNESS VISIT (AWV) IN MEDICARE PATIENT: Primary | ICD-10-CM

## 2024-05-21 DIAGNOSIS — E78.2 MIXED HYPERLIPIDEMIA: ICD-10-CM

## 2024-05-21 DIAGNOSIS — Z13.31 DEPRESSION SCREEN: ICD-10-CM

## 2024-05-21 DIAGNOSIS — Z28.21 IMMUNIZATION DECLINED: ICD-10-CM

## 2024-05-21 DIAGNOSIS — N18.31 STAGE 3A CHRONIC KIDNEY DISEASE: ICD-10-CM

## 2024-05-21 DIAGNOSIS — Z53.20 COLON CANCER SCREENING DECLINED: ICD-10-CM

## 2024-05-21 DIAGNOSIS — I10 PRIMARY HYPERTENSION: ICD-10-CM

## 2024-05-21 DIAGNOSIS — R73.09 ELEVATED HEMOGLOBIN A1C: ICD-10-CM

## 2024-05-21 DIAGNOSIS — Z53.20 MAMMOGRAM DECLINED: ICD-10-CM

## 2024-05-21 DIAGNOSIS — Z79.899 ON STATIN THERAPY: ICD-10-CM

## 2024-05-21 DIAGNOSIS — Z71.3 ENCOUNTER FOR DIETARY COUNSELING AND SURVEILLANCE: ICD-10-CM

## 2024-05-21 DIAGNOSIS — E03.9 ACQUIRED HYPOTHYROIDISM: ICD-10-CM

## 2024-05-21 DIAGNOSIS — Z91.81 AT LOW RISK FOR FALL: ICD-10-CM

## 2024-05-21 DIAGNOSIS — Z85.3 HISTORY OF BREAST CANCER: ICD-10-CM

## 2024-05-21 DIAGNOSIS — Z79.82 LONG-TERM USE OF ASPIRIN THERAPY: ICD-10-CM

## 2024-05-21 RX ORDER — ATENOLOL 50 MG/1
50 TABLET ORAL DAILY
Qty: 90 TABLET | Refills: 3 | Status: SHIPPED | OUTPATIENT
Start: 2024-05-21

## 2024-05-21 RX ORDER — LEVOTHYROXINE SODIUM 50 MCG
50 TABLET ORAL DAILY
Qty: 90 TABLET | Refills: 3 | Status: SHIPPED | OUTPATIENT
Start: 2024-05-21

## 2024-05-21 RX ORDER — PRAVASTATIN SODIUM 20 MG
20 TABLET ORAL DAILY
Qty: 90 TABLET | Refills: 3 | Status: SHIPPED | OUTPATIENT
Start: 2024-05-21

## 2024-05-21 RX ORDER — ASPIRIN 81 MG/1
81 TABLET ORAL DAILY
Qty: 90 TABLET | Refills: 3 | Status: SHIPPED | OUTPATIENT
Start: 2024-05-21

## 2024-05-21 RX ORDER — AMLODIPINE BESYLATE 5 MG/1
5 TABLET ORAL DAILY
Qty: 90 TABLET | Refills: 0 | Status: SHIPPED | OUTPATIENT
Start: 2024-05-21

## 2024-05-21 NOTE — PROGRESS NOTES
The ABCs of the Annual Wellness Visit  Subsequent Medicare Wellness Visit    Subjective    Christina Guillen is a 79 y.o. female who presents for a Subsequent Medicare Wellness Visit.    The following portions of the patient's history were reviewed and   updated as appropriate: allergies, current medications, past family history, past medical history, past social history, past surgical history, and problem list.    Compared to one year ago, the patient feels her physical   health is the same.    Compared to one year ago, the patient feels her mental   health is the same.    Recent Hospitalizations:  She was not admitted to the hospital during the last year.       Current Medical Providers:  Patient Care Team:  Sunita Mora APRN as PCP - General (Nurse Practitioner)    Outpatient Medications Prior to Visit   Medication Sig Dispense Refill    amLODIPine (NORVASC) 5 MG tablet Take 1 tablet by mouth Daily. 90 tablet 0    aspirin 81 MG EC tablet Take 1 tablet by mouth Daily.      atenolol (TENORMIN) 50 MG tablet Take 1 tablet by mouth Daily. 90 tablet 0    pravastatin (PRAVACHOL) 20 MG tablet Take 1 tablet by mouth Daily. 90 tablet 0    Synthroid 50 MCG tablet Take 1 tablet by mouth Daily. 90 tablet 3     No facility-administered medications prior to visit.       No opioid medication identified on active medication list. I have reviewed chart for other potential  high risk medication/s and harmful drug interactions in the elderly.        Aspirin is on active medication list. Aspirin use is indicated based on review of current medical condition/s. Pros and cons of this therapy have been discussed today. Benefits of this medication outweigh potential harm.  Patient has been encouraged to continue taking this medication.  .      Patient Active Problem List   Diagnosis    Chronic pain of right knee    Gastroesophageal reflux disease without esophagitis    Breast pain, left    Lower urinary tract symptoms (LUTS)     "Urinary tract infectious disease    Healthcare maintenance    Squamous cell skin cancer    Hypothyroidism    Hyperlipidemia    Hypertensive disorder    COVID-19    On statin therapy    Stage 3a chronic kidney disease    Elevated hemoglobin A1c     Advance Care Planning   Advance Care Planning     Advance Directive is not on file.  ACP discussion was held with the patient during this visit. Patient does not have an advance directive, declines further assistance.     Objective    Vitals:    24 1534   BP: 110/52   Pulse: 58   Resp: 18   Temp: 97 °F (36.1 °C)   TempSrc: Temporal   SpO2: 100%   Weight: 67 kg (147 lb 12.8 oz)   Height: 162.6 cm (64.02\")   PainSc: 0-No pain     Estimated body mass index is 25.36 kg/m² as calculated from the following:    Height as of this encounter: 162.6 cm (64.02\").    Weight as of this encounter: 67 kg (147 lb 12.8 oz).           Does the patient have evidence of cognitive impairment? No    Lab Results   Component Value Date    TRIG 98 2024    HDL 49 2024    LDL 81 2024    VLDL 18 2024    HGBA1C 5.60 2024        HEALTH RISK ASSESSMENT    Smoking Status:  Social History     Tobacco Use   Smoking Status Never   Smokeless Tobacco Never     Alcohol Consumption:  Social History     Substance and Sexual Activity   Alcohol Use Never     Fall Risk Screen:    CAITY Fall Risk Assessment was completed, and patient is at LOW risk for falls.Assessment completed on:2024    Depression Screenin/21/2024     3:40 PM   PHQ-2/PHQ-9 Depression Screening   Little Interest or Pleasure in Doing Things 0-->not at all   Feeling Down, Depressed or Hopeless 0-->not at all   PHQ-9: Brief Depression Severity Measure Score 0       Health Habits and Functional and Cognitive Screenin/21/2024     3:43 PM   Functional & Cognitive Status   Do you have difficulty preparing food and eating? No   Do you have difficulty bathing yourself, getting dressed or grooming " yourself? No   Do you have difficulty using the toilet? No   Do you have difficulty moving around from place to place? No   Do you have trouble with steps or getting out of a bed or a chair? No   Current Diet Well Balanced Diet   Dental Exam Not up to date   Eye Exam Up to date   Exercise (times per week) 7 times per week   Current Exercises Include Yard Work;House Cleaning;Gardening   Do you need help using the phone?  No   Are you deaf or do you have serious difficulty hearing?  No   Do you need help to go to places out of walking distance? No   Do you need help shopping? No   Do you need help preparing meals?  No   Do you need help with housework?  No   Do you need help with laundry? No   Do you need help taking your medications? No   Do you need help managing money? No   Do you ever drive or ride in a car without wearing a seat belt? No   Have you felt unusual stress, anger or loneliness in the last month? No   Who do you live with? Spouse   If you need help, do you have trouble finding someone available to you? No   Have you been bothered in the last four weeks by sexual problems? No   Do you have difficulty concentrating, remembering or making decisions? No       Age-appropriate Screening Schedule:  Refer to the list below for future screening recommendations based on patient's age, sex and/or medical conditions. Orders for these recommended tests are listed in the plan section. The patient has been provided with a written plan.    Health Maintenance   Topic Date Due    DXA SCAN  07/14/2023    BMI FOLLOWUP  03/04/2025    LIPID PANEL  03/12/2025    ANNUAL WELLNESS VISIT  05/21/2025    Pneumococcal Vaccine 65+  Completed    COVID-19 Vaccine  Discontinued    RSV Vaccine - Adults  Discontinued    INFLUENZA VACCINE  Discontinued    MAMMOGRAM  Discontinued    TDAP/TD VACCINES  Discontinued    ZOSTER VACCINE  Discontinued                  CMS Preventative Services Quick Reference  Risk Factors Identified During  Encounter  Immunizations Discussed/Encouraged: Patient declined  Dental Screening Recommended  Patient declined immunizations as well as mammogram and DEXA scan  The above risks/problems have been discussed with the patient.  Pertinent information has been shared with the patient in the After Visit Summary.  An After Visit Summary and PPPS were made available to the patient.    Follow Up:   Next Medicare Wellness visit to be scheduled in 1 year.       Additional E&M Note during same encounter follows:  Patient has multiple medical problems which are significant and separately identifiable that require additional work above and beyond the Medicare Wellness Visit.      Chief Complaint  Medicare Wellness-subsequent    Subjective        HPI  Christina Guillen is also being seen today for subsequent Medicare wellness visit and follow-up on chronic conditions    Patient was last seen March 2024 to establish care with our office    At that time she declined immunizations.  Pneumonia vaccine is up-to-date.  Recommended updated tetanus, shingles, RSV.  Again patient declined vaccines at this time    Hypertension: Patient is currently on amlodipine 5 mg and atenolol 50 mg.  No chest pain, palpitations, swelling, headaches.  She has been to the hospital in the past for excessively high blood pressure.  Blood pressure looks great in the office today    Hyperlipidemia: Patient is currently on pravastatin 20 mg.  She also takes a once daily baby aspirin    Previous labs have noted an elevated A1c and altered kidney function    Hypothyroidism: Patient is on Synthroid at 50 mcg.  She is on name brand as she had reactions to off brand medication.  No changes to hair skin or nails    She is a non-smoker.  No alcohol intake or drug use.    Patient did start a new diet plan at the beginning of the year and has tolerated well    Last mammogram was in 2022.  She does have a history of a right mastectomy in 1975 for breast cancer in her 30s.   "She declined wanting to do any more mammograms.  Patient has not had an updated DEXA scan.  Her last colonoscopy was about 1 to 2 years ago with this 7-year recommended repeat.  She does not think she wants this completed    Patient is at low fall risk    Depression screen completed today with score of 0         Objective   Vital Signs:  /52   Pulse 58   Temp 97 °F (36.1 °C) (Temporal)   Resp 18   Ht 162.6 cm (64.02\")   Wt 67 kg (147 lb 12.8 oz)   SpO2 100%   BMI 25.36 kg/m²     Physical Exam  Vitals and nursing note reviewed.   Constitutional:       General: She is awake.      Appearance: Normal appearance. She is well-groomed and overweight.   HENT:      Head: Normocephalic and atraumatic.   Eyes:      Extraocular Movements: Extraocular movements intact.      Pupils: Pupils are equal, round, and reactive to light.      Comments: Glasses in place   Neck:      Vascular: No carotid bruit.   Cardiovascular:      Rate and Rhythm: Normal rate and regular rhythm.      Pulses: Normal pulses.           Radial pulses are 2+ on the right side and 2+ on the left side.        Posterior tibial pulses are 2+ on the right side and 2+ on the left side.      Heart sounds: Normal heart sounds, S1 normal and S2 normal.   Pulmonary:      Effort: Pulmonary effort is normal.      Breath sounds: Normal breath sounds.   Abdominal:      General: Bowel sounds are normal.      Palpations: Abdomen is soft.   Musculoskeletal:         General: Normal range of motion.      Right lower leg: No edema.      Left lower leg: No edema.   Skin:     General: Skin is warm and dry.   Neurological:      Mental Status: She is alert and oriented to person, place, and time.      Comments: Mental status fully intact as patient was able to provide a detailed description of the events   Psychiatric:         Mood and Affect: Mood normal.         Behavior: Behavior normal. Behavior is cooperative.         Thought Content: Thought content normal.       "   Judgment: Judgment normal.                 Assessment and Plan   Diagnoses and all orders for this visit:    1. Encounter for subsequent annual wellness visit (AWV) in Medicare patient (Primary)    2. Encounter for well adult exam without abnormal findings    3. Immunization declined    4. Primary hypertension  -     amLODIPine (NORVASC) 5 MG tablet; Take 1 tablet by mouth Daily.  Dispense: 90 tablet; Refill: 0  -     atenolol (TENORMIN) 50 MG tablet; Take 1 tablet by mouth Daily.  Dispense: 90 tablet; Refill: 3    5. Mixed hyperlipidemia  -     pravastatin (PRAVACHOL) 20 MG tablet; Take 1 tablet by mouth Daily.  Dispense: 90 tablet; Refill: 3    6. On statin therapy    7. Long-term use of aspirin therapy  -     aspirin 81 MG EC tablet; Take 1 tablet by mouth Daily.  Dispense: 90 tablet; Refill: 3    8. Stage 3a chronic kidney disease    9. Elevated hemoglobin A1c    10. Acquired hypothyroidism  -     Synthroid 50 MCG tablet; Take 1 tablet by mouth Daily.  Dispense: 90 tablet; Refill: 3    11. Non-smoker    12. Overweight (BMI 25.0-29.9)    13. Encounter for dietary counseling and surveillance    14. BMI 25.0-25.9,adult    15. History of breast cancer    16. Status post right mastectomy    17. Mammogram declined    18. Colon cancer screening declined    19. Depression screen    20. At low risk for fall    Plan  Subsequent Medicare wellness visit as well as follow-up on chronic conditions completed today    Immunizations were declined along with mammogram, DEXA scan, likely colonoscopy in the future    Patient will continue with amlodipine and atenolol related to her blood pressure.  Continue to monitor blood pressure at home    Continue with once daily baby aspirin and statin therapy.  Continue with healthy diet and lifestyle    We will continue to monitor her A1c levels and kidney function.    Continue with levothyroxine for hypothyroidism    Labs were obtained in March.  We will plan to obtain these at next  visit    Continue with non-smoking status and healthy lifestyle    Plan to follow-up in 1 year for subsequent Medicare wellness visit    Go to ER if any condition worsens or severe       I spent 25-30 minutes caring for Christina on this date of service. This time includes time spent by me in the following activities:preparing for the visit, reviewing tests, obtaining and/or reviewing a separately obtained history, performing a medically appropriate examination and/or evaluation , counseling and educating the patient/family/caregiver, ordering medications, tests, or procedures, referring and communicating with other health care professionals , and documenting information in the medical record  Follow Up   Return for 1 year for wellness visit.  Patient was given instructions and counseling regarding her condition or for health maintenance advice. Please see specific information pulled into the AVS if appropriate.

## 2024-07-23 DIAGNOSIS — E03.9 ACQUIRED HYPOTHYROIDISM: ICD-10-CM

## 2024-07-23 RX ORDER — LEVOTHYROXINE SODIUM 50 MCG
50 TABLET ORAL DAILY
Qty: 90 TABLET | Refills: 3 | OUTPATIENT
Start: 2024-07-23

## 2024-08-07 ENCOUNTER — TELEPHONE (OUTPATIENT)
Dept: INTERNAL MEDICINE | Facility: CLINIC | Age: 79
End: 2024-08-07
Payer: MEDICARE

## 2024-08-07 NOTE — TELEPHONE ENCOUNTER
Caller: HARITHA WITH [employer]     Relationship: PRESCRIPTION BENEFITS     Best call back number: [phone]     Which medication are you concerned about: Synthroid 50 MCG tablet     Who prescribed you this medication: ISIDRO GARRIDO    What are your concerns: [facility] ONLY CARRIES BRAND NAME SYNTHROID. THERE CANNOT BE A BRENDAN ON THE PRESCRIPTION. THEY ARE JUST TRYING TO GET THIS TAKEN OFF OF THE DIRECTIONS SO THAT THE PHARMACY CAN USE IT TO SUBSTITUTE. THE NAME BRAND IS NOT ON HER FORMULARY BUT THE MAIL ORDER IS ABLE TO USE A CODE WHEN SUBSTITUTING FOR THE NAME BRAND AND CAN STILL GIVE IT TO HER AT GENERIC PRICE. PLEASE SEND THIS IN WITHOUT BRENDAN DIRECTIONS. IF THERE ARE QUESTIONS PLEASE CALL HARITHA.     [facility] - [place], [place] [address] [phone] [facility] [phone] FX

## 2024-08-08 ENCOUNTER — TELEPHONE (OUTPATIENT)
Dept: INTERNAL MEDICINE | Facility: CLINIC | Age: 79
End: 2024-08-08
Payer: MEDICARE

## 2024-08-08 DIAGNOSIS — E03.9 ACQUIRED HYPOTHYROIDISM: ICD-10-CM

## 2024-08-08 RX ORDER — LEVOTHYROXINE SODIUM 0.05 MG/1
50 TABLET ORAL DAILY
Qty: 90 TABLET | Refills: 2 | Status: SHIPPED | OUTPATIENT
Start: 2024-08-08

## 2024-08-08 NOTE — TELEPHONE ENCOUNTER
[facility]    Relationship:     Best call back number: [phone]     Which medication are you concerned about: levothyroxine (Synthroid) 50 MCG tablet     Who prescribed you this medication: PCP    What are your concerns: HARITHA STATES SHE CALLED 8/7/24 AND LEFT A MESSAGE STATING PCP WILL NEED TO SEND A PRESCRIPTION TO THE PHARMACY WITHOUT THE INSTRUCTIONS TO BRENDAN. HARITHA STATES IF BRENDAN IS INCLUDED WITH THE PRESCRIPTION THE PATIENT WILL BE CHARGED SIGNIFICANTLY MORE FOR THE NAME BRAND. HARITHA STATES SHE SEES PCP SENT A NEW PRESCRIPTION TO THE PHARMACY BUT BRENDAN WAS STILL ATTACHED TO THE PRESCRIPTION. HARITHA STATES EVEN IF PCP DOES NOT INCLUDE BRENDAN, PATIENT WILL STILL RECEIVE THE NAME BRAND BUT WITHOUT BRENDAN, THE PATIENT WILL SEE A SIGNIFICANT DECREASE IN COST.     PHARMACY: [facility] - [address] - [phone] [facility] [phone] [facility] [phone]

## 2024-08-14 ENCOUNTER — TELEPHONE (OUTPATIENT)
Dept: INTERNAL MEDICINE | Facility: CLINIC | Age: 79
End: 2024-08-14
Payer: MEDICARE

## 2024-08-14 DIAGNOSIS — E03.9 ACQUIRED HYPOTHYROIDISM: ICD-10-CM

## 2024-08-14 RX ORDER — LEVOTHYROXINE SODIUM 0.05 MG/1
50 TABLET ORAL DAILY
Qty: 90 TABLET | Refills: 2 | Status: SHIPPED | OUTPATIENT
Start: 2024-08-14

## 2024-08-14 NOTE — TELEPHONE ENCOUNTER
Notified them that new note had been sent over, they reported that they would get in contact with the pharmacy and notify us if the pharmacy had received the note.

## 2024-08-19 ENCOUNTER — TELEPHONE (OUTPATIENT)
Dept: INTERNAL MEDICINE | Facility: CLINIC | Age: 79
End: 2024-08-19

## 2024-08-27 ENCOUNTER — TELEPHONE (OUTPATIENT)
Dept: INTERNAL MEDICINE | Facility: CLINIC | Age: 79
End: 2024-08-27
Payer: MEDICARE

## 2024-08-27 NOTE — TELEPHONE ENCOUNTER
Caller: Christina Guillen    Relationship: Self    Best call back number:  [phone]    Which medication are you concerned about:     levothyroxine (Synthroid) 50 MCG tablet       Who prescribed you this medication: A RADHIKA    When did you start taking this medication: ONGOING    What are your concerns: THIS MEDICATION WAS SENT IN TO [facility], AND PATIENT CAN ONLY TAKE THE SYNTHROID, NOT THE GENERIC OF LEVOTHYROXINE; [facility] HAS SENT A LETTER  TO THE PATIENT REGARDING A RADHIKA HAD MADE A CHANGE IN THE MEDICATION.  [facility] HAS BEEN DISPENSING THE BRAND NAME OF THE MEDICATION AND PATIENT IS WORKING WITH A COALITION TO GET IT AT THE GENERIC [facility].  SHE ADVISED IT WAS SENT DAW1 AND IT SHOULD BE A DAW5; THERE IS ALSO A DIFFERENCE IN PRICING AS THIS BRAND WAS $65, AND THROUGH THE COLATION IT WOULD BE ONLY $10    PLEASE CALL TO ADVISE

## 2024-08-28 ENCOUNTER — TELEPHONE (OUTPATIENT)
Dept: INTERNAL MEDICINE | Facility: CLINIC | Age: 79
End: 2024-08-28
Payer: MEDICARE

## 2024-08-28 NOTE — TELEPHONE ENCOUNTER
Spoke to eduarda with [facility]. Patients medication has already been sent in so she will have to pay the 65 for this round. Everything was sent in correctly but when the pharmacy has hard stops they have to call us and that makes it be sent in as a brand only so when we send it in just make sure its marked NOT brand only and everything should go through good.

## 2024-08-28 NOTE — TELEPHONE ENCOUNTER
Spoke to patient, she needs her refills to be fixed through insurance so she can get them for 10 dollars instead of 65 due to financial problems. I will call insurance [facility] and speak to them. [phone]

## 2024-08-28 NOTE — TELEPHONE ENCOUNTER
PATIENT CALLED AND STATES THAT THE MED HAS BEEN SENT IN WITH THE INCORRECT CODE.    PATIENT WOULD LIKE TO SPEAK WITH SOMEONE IN THE OFFICE.    CALL BACK: [phone]

## 2024-08-28 NOTE — TELEPHONE ENCOUNTER
LVM for patient explaining about her prescription and how it gets sent in if she has any other questions to call back. Just always make sure we send it in as NOT brand only.

## 2024-08-29 ENCOUNTER — OFFICE VISIT (OUTPATIENT)
Dept: INTERNAL MEDICINE | Facility: CLINIC | Age: 79
End: 2024-08-29
Payer: MEDICARE

## 2024-08-29 VITALS
TEMPERATURE: 98 F | BODY MASS INDEX: 24.89 KG/M2 | WEIGHT: 145.8 LBS | HEIGHT: 64 IN | HEART RATE: 57 BPM | SYSTOLIC BLOOD PRESSURE: 128 MMHG | OXYGEN SATURATION: 97 % | DIASTOLIC BLOOD PRESSURE: 80 MMHG

## 2024-08-29 DIAGNOSIS — K08.89 PAIN, DENTAL: Primary | ICD-10-CM

## 2024-08-29 DIAGNOSIS — N18.31 STAGE 3A CHRONIC KIDNEY DISEASE: ICD-10-CM

## 2024-08-29 PROCEDURE — 1126F AMNT PAIN NOTED NONE PRSNT: CPT | Performed by: NURSE PRACTITIONER

## 2024-08-29 PROCEDURE — 3074F SYST BP LT 130 MM HG: CPT | Performed by: NURSE PRACTITIONER

## 2024-08-29 PROCEDURE — 99213 OFFICE O/P EST LOW 20 MIN: CPT | Performed by: NURSE PRACTITIONER

## 2024-08-29 PROCEDURE — 1159F MED LIST DOCD IN RCRD: CPT | Performed by: NURSE PRACTITIONER

## 2024-08-29 PROCEDURE — 3079F DIAST BP 80-89 MM HG: CPT | Performed by: NURSE PRACTITIONER

## 2024-08-29 PROCEDURE — 1160F RVW MEDS BY RX/DR IN RCRD: CPT | Performed by: NURSE PRACTITIONER

## 2024-08-29 RX ORDER — IBUPROFEN 800 MG/1
800 TABLET, FILM COATED ORAL EVERY 8 HOURS PRN
Qty: 30 TABLET | Refills: 0 | Status: SHIPPED | OUTPATIENT
Start: 2024-08-29

## 2024-08-29 RX ORDER — LIDOCAINE HYDROCHLORIDE 20 MG/ML
10 SOLUTION OROPHARYNGEAL 3 TIMES DAILY
Qty: 300 ML | Refills: 0 | Status: SHIPPED | OUTPATIENT
Start: 2024-08-29 | End: 2024-09-08

## 2024-08-29 NOTE — PROGRESS NOTES
Office Note     Name: Christina Guillen    : 1945     MRN: [id number]     Chief Complaint  Dental Pain (Patient stated she has an abscess on her right side tooth she has appointment Tuesday to get it taken care of. She is in a lot of pain with it to where its also making her ear sore. )    Subjective     History of Present Illness:  Christina Guillen is a 79 y.o. female who presents today for concerns related to dental pain.  Patient does have a filling that fell out many years ago to the upper jawline molar.  She does note an abscess to the right tooth.  She has been on a Z-Ramírez which she will finish Monday.  She does have a procedure on Tuesday with a dentist.  She does continue to complain of some discomfort.  She states that she has been using Tylenol but she does not feel that it works.  She is concerned about taking ibuprofen due to her chronic kidney disease.        Past Medical History:   Diagnosis Date    Arthritis     Breast cancer 1975    right     Cancer     Cataract 2005    Disease of thyroid gland     Fracture of ankle 2020    Hyperlipidemia ??    Hypertension     Hypothyroidism ??    Knee swelling 2021    Urinary tract infection        Past Surgical History:   Procedure Laterality Date    BREAST BIOPSY Right 1975    COLONOSCOPY      No polyps    EYE SURGERY      Detached retina    MASTECTOMY Right 1975       Social History     Socioeconomic History    Marital status:    Tobacco Use    Smoking status: Never    Smokeless tobacco: Never   Vaping Use    Vaping status: Never Used   Substance and Sexual Activity    Alcohol use: Never    Drug use: Never    Sexual activity: Not Currently     Partners: Male         Current Outpatient Medications:     amLODIPine (NORVASC) 5 MG tablet, Take 1 tablet by mouth Daily., Disp: 90 tablet, Rfl: 0    aspirin 81 MG EC tablet, Take 1 tablet by mouth Daily., Disp: 90 tablet, Rfl: 3    atenolol (TENORMIN) 50 MG tablet, Take 1 tablet by mouth  "Daily., Disp: 90 tablet, Rfl: 3    levothyroxine (Synthroid) 50 MCG tablet, Take 1 tablet by mouth Daily., Disp: 90 tablet, Rfl: 2    pravastatin (PRAVACHOL) 20 MG tablet, Take 1 tablet by mouth Daily., Disp: 90 tablet, Rfl: 3    ibuprofen (ADVIL,MOTRIN) 800 MG tablet, Take 1 tablet by mouth Every 8 (Eight) Hours As Needed for Mild Pain., Disp: 30 tablet, Rfl: 0    Lidocaine Viscous HCl (XYLOCAINE) 2 % solution, Take 10 mL by mouth 3 (Three) Times a Day for 10 days., Disp: 300 mL, Rfl: 0    Objective     Vital Signs  /80   Pulse 57   Temp 98 °F (36.7 °C)   Ht 162.6 cm (64.02\")   Wt 66.1 kg (145 lb 12.8 oz)   SpO2 97%   BMI 25.01 kg/m²   Estimated body mass index is 25.01 kg/m² as calculated from the following:    Height as of this encounter: 162.6 cm (64.02\").    Weight as of this encounter: 66.1 kg (145 lb 12.8 oz).                 Physical Exam  Vitals and nursing note reviewed.   Constitutional:       Appearance: Normal appearance.   HENT:      Head: Normocephalic and atraumatic.      Mouth/Throat:      Comments: Upper right jawline with a molar that did have a broken piece of tooth.  There was noted erythema and swelling around there  Eyes:      Extraocular Movements: Extraocular movements intact.      Pupils: Pupils are equal, round, and reactive to light.   Cardiovascular:      Rate and Rhythm: Normal rate and regular rhythm.   Pulmonary:      Effort: Pulmonary effort is normal.   Musculoskeletal:         General: Normal range of motion.   Skin:     General: Skin is warm and dry.   Neurological:      Mental Status: She is alert and oriented to person, place, and time.   Psychiatric:         Mood and Affect: Mood normal.         Behavior: Behavior normal.                   Assessment and Plan     Diagnoses and all orders for this visit:    1. Pain, dental (Primary)  -     ibuprofen (ADVIL,MOTRIN) 800 MG tablet; Take 1 tablet by mouth Every 8 (Eight) Hours As Needed for Mild Pain.  Dispense: 30 " tablet; Refill: 0  -     Lidocaine Viscous HCl (XYLOCAINE) 2 % solution; Take 10 mL by mouth 3 (Three) Times a Day for 10 days.  Dispense: 300 mL; Refill: 0    2. Stage 3a chronic kidney disease    Plan  Patient will continue to take Tylenol as needed.  I did send in extra strength ibuprofen.  We did discuss that it is processed through the kidneys.  I would recommend she stay well-hydrated.  She is safe to use this at this time as only for a short period of time.  I also sent her in viscous lidocaine to put around the area for numbing purposes.  Continue with salt water gargles.  Continue to stay well-hydrated.  Keep upcoming appointment with dentistry for procedure on Tuesday.  Go to ER if any condition worsens or severe.  We will plan to follow-up as scheduled in 2025    Follow Up  Return for Next scheduled follow up.    RUBINA Baker    Part of this note may be an electronic transcription/translation of spoken language to printed text using the Dragon Dictation System.

## 2024-09-22 DIAGNOSIS — I10 PRIMARY HYPERTENSION: ICD-10-CM

## 2024-09-23 RX ORDER — AMLODIPINE BESYLATE 5 MG/1
5 TABLET ORAL DAILY
Qty: 90 TABLET | Refills: 0 | Status: SHIPPED | OUTPATIENT
Start: 2024-09-23

## 2024-10-18 ENCOUNTER — TELEPHONE (OUTPATIENT)
Dept: INTERNAL MEDICINE | Facility: CLINIC | Age: 79
End: 2024-10-18
Payer: MEDICARE

## 2024-11-04 DIAGNOSIS — E03.9 ACQUIRED HYPOTHYROIDISM: ICD-10-CM

## 2024-11-04 DIAGNOSIS — E03.9 ACQUIRED HYPOTHYROIDISM: Primary | ICD-10-CM

## 2024-11-04 RX ORDER — LEVOTHYROXINE SODIUM 50 UG/1
50 TABLET ORAL DAILY
Qty: 90 TABLET | Refills: 2 | Status: SHIPPED | OUTPATIENT
Start: 2024-11-04

## 2024-11-04 RX ORDER — LEVOTHYROXINE SODIUM 50 UG/1
50 TABLET ORAL DAILY
Qty: 90 TABLET | Refills: 0 | Status: SHIPPED | OUTPATIENT
Start: 2024-11-04 | End: 2024-11-04 | Stop reason: SDUPTHER

## 2024-11-18 ENCOUNTER — TELEPHONE (OUTPATIENT)
Dept: INTERNAL MEDICINE | Facility: CLINIC | Age: 79
End: 2024-11-18
Payer: MEDICARE

## 2024-11-18 NOTE — TELEPHONE ENCOUNTER
Caller: RAYMOND    Relationship: Other [facility]     Best call back number: [phone]     What was the call regarding: REQUESTING A DURABLE MEDICAL EQUIPMENT, 1 SILICONE BREASTS PROSTHESIS, 1 LEISURE FABRIC BREAST FORM, 6 MASTECTOMY BRAS. DIAGNOSIS CODE C50.911. PLEASE WRITE A LETTER STATING THAT THESE ARE NECCESSARY PATIENT HAS NOT HAD ANY COSMETIC SURGERY DONE, THESE ARE MEDICALLY NECCESSARY.     FAX: [phone]

## 2024-11-20 NOTE — TELEPHONE ENCOUNTER
Caller: JOHN    Relationship to patient: Hermin[facility] FRANCISCO    Best call back number: [phone]     Patient is needing:  THIS ORDER NEEDS TO BE CALLED IN AS A PRESCRIPTION FORMAT, WRITTEN PRESCRIPTION. PLEASE ADVISE.

## 2024-11-27 NOTE — PROGRESS NOTES
Office Note     Name: Christina Guillen    : 1945     MRN: [id number]     Chief Complaint  Establish Care (Pt declines flu vaccine) and Hypothyroidism    Subjective     History of Present Illness:  Christina Guillen is a 78 y.o. female who presents today for establish care with new provider    Patient declined immunizations today    Patient was previously seeing Macie with our office.  Patient was last seen by Macie for a health maintenance visit 2023.  She is post to follow-up in 6 months for subsequent Medicare wellness visit.    Care gaps were reviewed today.  Patient is up-to-date with pneumonia vaccine.  Recommendations for updated tetanus, shingles, flu, RSV  -Patient declined vaccines today    Hypertension: Patient is currently on amlodipine and atenolol.  Blood pressure is currently well-controlled in office today.  Patient states that she did go to the hospital at 1 point for excessively elevated blood pressure and they made her wear a heart monitor.  She was unaware that her heart was skipping beats.  No chest pain, palpitations, swelling, headaches    Hyperlipidemia: Currently well-controlled on pravastatin 20 mg.  Patient is also on a once daily baby aspirin    Previous labs noted an elevated A1c as well as some alterations to her kidney function    Hypothyroidism: Patient is currently on Synthroid 50 mcg.  She does need brand-name as she took levothyroxine and had a rash about 20 minutes after taking the medication.  She states that she can only receive this medication through [facility] and she usually has to have an appeal so that it will be a little bit cheaper.  Again patient is not able to tolerate levothyroxine and can only do namebrand.  Patient has not had her thyroid removed.  No changes to hair skin or nails    Patient is a non-smoker.  No alcohol intake or drug use    Patient recently started on a new diet at the beginning of the year and has noted some weight loss  "changes    Patient's last mammogram was in 2022.  She does have a history of a right mastectomy back in 1975 for breast cancer in her 30s.  She declined wanting to do any more mammograms.  DEXA scan was also ordered at a previous visit but patient has not had this completed.  Patient states her last colonoscopy was about 1 to 2 years ago with recommendations to follow-up in 7 years but she does not think she is good to have this completed        Past Medical History:   Diagnosis Date    Arthritis 2020    Breast cancer 1975    right     Cancer     Cataract 2005    Disease of thyroid gland     Fracture of ankle Nov 2020    Hyperlipidemia ??    Hypertension     Hypothyroidism ??    Knee swelling Jan 2021    Urinary tract infection        Past Surgical History:   Procedure Laterality Date    BREAST BIOPSY Right 1975    COLONOSCOPY  2022    No polyps    EYE SURGERY  2013    Detached retina    MASTECTOMY Right 1975       Social History     Socioeconomic History    Marital status:    Tobacco Use    Smoking status: Never    Smokeless tobacco: Never   Vaping Use    Vaping status: Never Used   Substance and Sexual Activity    Alcohol use: Never    Drug use: Never    Sexual activity: Not Currently     Partners: Male         Current Outpatient Medications:     amLODIPine (NORVASC) 5 MG tablet, Take 1 tablet by mouth Daily., Disp: 90 tablet, Rfl: 0    aspirin 81 MG EC tablet, Take 1 tablet by mouth Daily., Disp: , Rfl:     atenolol (TENORMIN) 50 MG tablet, Take 1 tablet by mouth Daily., Disp: 90 tablet, Rfl: 0    Cranberry 125 MG tablet, Take  by mouth., Disp: , Rfl:     pravastatin (PRAVACHOL) 20 MG tablet, Take 1 tablet by mouth Daily., Disp: 90 tablet, Rfl: 0    Synthroid 50 MCG tablet, Take 1 tablet by mouth Daily., Disp: 90 tablet, Rfl: 3    Objective     Vital Signs  /64   Pulse 67   Temp 97.5 °F (36.4 °C)   Ht 162.6 cm (64\")   Wt 67.1 kg (148 lb)   SpO2 100%   BMI 25.40 kg/m²   Estimated body mass index " "is 25.4 kg/m² as calculated from the following:    Height as of this encounter: 162.6 cm (64\").    Weight as of this encounter: 67.1 kg (148 lb).            PHQ-9 Depression Screening  Little interest or pleasure in doing things? 0-->not at all   Feeling down, depressed, or hopeless? 1-->several days (recent death close friend)   Trouble falling or staying asleep, or sleeping too much?     Feeling tired or having little energy?     Poor appetite or overeating?     Feeling bad about yourself - or that you are a failure or have let yourself or your family down?     Trouble concentrating on things, such as reading the newspaper or watching television?     Moving or speaking so slowly that other people could have noticed? Or the opposite - being so fidgety or restless that you have been moving around a lot more than usual?     Thoughts that you would be better off dead, or of hurting yourself in some way?     PHQ-9 Total Score 1   If you checked off any problems, how difficult have these problems made it for you to do your work, take care of things at home, or get along with other people?       PHQ-9 Total Score: 1       Physical Exam  Vitals and nursing note reviewed.   Constitutional:       General: She is awake.      Appearance: Normal appearance. She is well-groomed and overweight.   HENT:      Head: Normocephalic and atraumatic.      Right Ear: Hearing and external ear normal.      Left Ear: Hearing and external ear normal.      Nose: Nose normal.      Mouth/Throat:      Lips: Pink.      Mouth: Mucous membranes are moist.   Eyes:      Extraocular Movements: Extraocular movements intact.      Pupils: Pupils are equal, round, and reactive to light.      Comments: Glasses in place   Neck:      Thyroid: No thyroid mass, thyromegaly or thyroid tenderness.      Vascular: No carotid bruit.   Cardiovascular:      Rate and Rhythm: Normal rate and regular rhythm.      Pulses: Normal pulses.           Radial pulses are 2+ on " the right side and 2+ on the left side.        Posterior tibial pulses are 2+ on the right side and 2+ on the left side.      Heart sounds: Normal heart sounds, S1 normal and S2 normal.   Pulmonary:      Effort: Pulmonary effort is normal.      Breath sounds: Normal breath sounds.   Abdominal:      General: Bowel sounds are normal.      Palpations: Abdomen is soft.   Musculoskeletal:         General: Normal range of motion.      Right lower leg: No edema.      Left lower leg: No edema.   Skin:     General: Skin is warm and dry.   Neurological:      Mental Status: She is alert and oriented to person, place, and time.      Comments: Mental status fully intact as patient was able to provide a detailed description of the events   Psychiatric:         Mood and Affect: Mood normal.         Behavior: Behavior normal. Behavior is cooperative.         Thought Content: Thought content normal.         Judgment: Judgment normal.                   Assessment and Plan     Diagnoses and all orders for this visit:    1. Establishing care with new doctor, encounter for (Primary)    2. Immunization declined    3. Primary hypertension  -     amLODIPine (NORVASC) 5 MG tablet; Take 1 tablet by mouth Daily.  Dispense: 90 tablet; Refill: 0  -     atenolol (TENORMIN) 50 MG tablet; Take 1 tablet by mouth Daily.  Dispense: 90 tablet; Refill: 0  -     CBC (No Diff); Future  -     Comprehensive Metabolic Panel; Future  -     Urinalysis With Culture If Indicated -; Future  -     MicroAlbumin, Urine, Random - Urine, Clean Catch; Future    4. Mixed hyperlipidemia  -     pravastatin (PRAVACHOL) 20 MG tablet; Take 1 tablet by mouth Daily.  Dispense: 90 tablet; Refill: 0  -     Lipid Panel; Future    5. Long-term use of aspirin therapy    6. On statin therapy    7. Prediabetes  -     Hemoglobin A1c; Future    8. Kidney dysfunction    9. Stage 3a chronic kidney disease    10. Acquired hypothyroidism  -     TSH Rfx On Abnormal To Free T4;  Future    11. Non-smoker    12. Overweight (BMI 25.0-29.9)    13. BMI 25.0-25.9,adult    14. Encounter for dietary counseling and surveillance    15. History of breast cancer    16. Status post right mastectomy    17. Mammogram declined    18. Colon cancer screening declined    19. Depression screen    Plan  Establish care visit completed with patient today    Immunizations declines    Refill of amlodipine and atenolol provided for patient regarding hypertension.  Continue to intermittently monitor blood pressure at home    Continue with aspirin and statin therapy for hyperlipidemia.  Continue with healthy diet and exercise    Labs are ordered and patient will have these obtained within the next 2 weeks    Patient's last Synthroid was sent in August for 1 year.  Patient is not able to tolerate levothyroxine and will need namebrand    Continue with non-smoking status    Patient does have a history of breast cancer with right mastectomy.  She declined further mammograms and colonoscopies    Depression screen completed today    Plan to follow-up in 10 to 12 weeks for subsequent Medicare wellness visit    Follow Up  Return for 10-12 weeks for [facility] wellness visit .    RUBINA Baker    Part of this note may be an electronic transcription/translation of spoken language to printed text using the Dragon Dictation System.   Theresa eRd MD

## 2024-12-23 DIAGNOSIS — I10 PRIMARY HYPERTENSION: ICD-10-CM

## 2024-12-23 RX ORDER — AMLODIPINE BESYLATE 5 MG/1
5 TABLET ORAL DAILY
Qty: 90 TABLET | Refills: 1 | Status: SHIPPED | OUTPATIENT
Start: 2024-12-23

## 2025-05-27 ENCOUNTER — OFFICE VISIT (OUTPATIENT)
Dept: INTERNAL MEDICINE | Facility: CLINIC | Age: 80
End: 2025-05-27
Payer: MEDICARE

## 2025-05-27 VITALS
OXYGEN SATURATION: 96 % | SYSTOLIC BLOOD PRESSURE: 112 MMHG | DIASTOLIC BLOOD PRESSURE: 66 MMHG | HEIGHT: 64 IN | HEART RATE: 56 BPM | TEMPERATURE: 98 F | BODY MASS INDEX: 25.88 KG/M2 | WEIGHT: 151.6 LBS

## 2025-05-27 DIAGNOSIS — G89.29 CHRONIC PAIN OF BOTH KNEES: ICD-10-CM

## 2025-05-27 DIAGNOSIS — Z00.00 ENCOUNTER FOR SUBSEQUENT ANNUAL WELLNESS VISIT (AWV) IN MEDICARE PATIENT: Primary | ICD-10-CM

## 2025-05-27 DIAGNOSIS — E03.9 ACQUIRED HYPOTHYROIDISM: ICD-10-CM

## 2025-05-27 DIAGNOSIS — Z79.899 ON STATIN THERAPY: ICD-10-CM

## 2025-05-27 DIAGNOSIS — Z78.9 NON-SMOKER: ICD-10-CM

## 2025-05-27 DIAGNOSIS — Z13.31 DEPRESSION SCREEN: ICD-10-CM

## 2025-05-27 DIAGNOSIS — Z91.81 AT LOW RISK FOR FALL: ICD-10-CM

## 2025-05-27 DIAGNOSIS — Z28.21 IMMUNIZATION DECLINED: ICD-10-CM

## 2025-05-27 DIAGNOSIS — N18.31 STAGE 3A CHRONIC KIDNEY DISEASE: ICD-10-CM

## 2025-05-27 DIAGNOSIS — M25.562 CHRONIC PAIN OF BOTH KNEES: ICD-10-CM

## 2025-05-27 DIAGNOSIS — Z90.11 STATUS POST RIGHT MASTECTOMY: ICD-10-CM

## 2025-05-27 DIAGNOSIS — Z85.3 HISTORY OF BREAST CANCER: ICD-10-CM

## 2025-05-27 DIAGNOSIS — Z53.20 MAMMOGRAM DECLINED: ICD-10-CM

## 2025-05-27 DIAGNOSIS — Z79.82 LONG-TERM USE OF ASPIRIN THERAPY: ICD-10-CM

## 2025-05-27 DIAGNOSIS — Z53.20 COLON CANCER SCREENING DECLINED: ICD-10-CM

## 2025-05-27 DIAGNOSIS — I10 PRIMARY HYPERTENSION: ICD-10-CM

## 2025-05-27 DIAGNOSIS — M25.561 CHRONIC PAIN OF BOTH KNEES: ICD-10-CM

## 2025-05-27 DIAGNOSIS — Z97.3 WEARS GLASSES: ICD-10-CM

## 2025-05-27 DIAGNOSIS — E78.2 MIXED HYPERLIPIDEMIA: ICD-10-CM

## 2025-05-27 DIAGNOSIS — R73.09 ELEVATED HEMOGLOBIN A1C: ICD-10-CM

## 2025-05-27 RX ORDER — ATENOLOL 50 MG/1
50 TABLET ORAL DAILY
Qty: 90 TABLET | Refills: 3 | Status: SHIPPED | OUTPATIENT
Start: 2025-05-27

## 2025-05-27 RX ORDER — AMLODIPINE BESYLATE 5 MG/1
5 TABLET ORAL DAILY
Qty: 90 TABLET | Refills: 3 | Status: SHIPPED | OUTPATIENT
Start: 2025-05-27

## 2025-05-27 RX ORDER — LEVOTHYROXINE SODIUM 50 UG/1
50 TABLET ORAL DAILY
Qty: 90 TABLET | Refills: 3 | Status: SHIPPED | OUTPATIENT
Start: 2025-05-27

## 2025-05-27 RX ORDER — ASPIRIN 81 MG/1
81 TABLET ORAL DAILY
Qty: 90 TABLET | Refills: 3 | Status: SHIPPED | OUTPATIENT
Start: 2025-05-27

## 2025-05-27 RX ORDER — LEVOTHYROXINE SODIUM 50 UG/1
50 TABLET ORAL DAILY
Qty: 90 TABLET | Refills: 3 | Status: CANCELLED | OUTPATIENT
Start: 2025-05-27

## 2025-05-27 RX ORDER — IBUPROFEN 200 MG
200 TABLET ORAL EVERY 6 HOURS PRN
Qty: 30 TABLET | Refills: 2 | Status: SHIPPED | OUTPATIENT
Start: 2025-05-27

## 2025-05-27 RX ORDER — PRAVASTATIN SODIUM 20 MG
20 TABLET ORAL DAILY
Qty: 90 TABLET | Refills: 3 | Status: SHIPPED | OUTPATIENT
Start: 2025-05-27

## 2025-05-27 NOTE — PROGRESS NOTES
Subjective   The ABCs of the Annual Wellness Visit  Medicare Wellness Visit      Christina Guillen is a 80 y.o. patient who presents for a Medicare Wellness Visit.    The following portions of the patient's history were reviewed and   updated as appropriate: allergies, current medications, past family history, past medical history, past social history, past surgical history, and problem list.    Compared to one year ago, the patient's physical   health is the same.  Compared to one year ago, the patient's mental   health is the same.    Recent Hospitalizations:  She was not admitted to the hospital during the last year.     Current Medical Providers:  Patient Care Team:  Sunita Mora APRN as PCP - General (Nurse Practitioner)    Outpatient Medications Prior to Visit   Medication Sig Dispense Refill    amLODIPine (NORVASC) 5 MG tablet Take 1 tablet by mouth once daily 90 tablet 1    aspirin 81 MG EC tablet Take 1 tablet by mouth Daily. 90 tablet 3    atenolol (TENORMIN) 50 MG tablet Take 1 tablet by mouth Daily. 90 tablet 3    ibuprofen (ADVIL,MOTRIN) 800 MG tablet Take 1 tablet by mouth Every 8 (Eight) Hours As Needed for Mild Pain. 30 tablet 0    levothyroxine (Synthroid) 50 MCG tablet Take 1 tablet by mouth Daily. 90 tablet 2    pravastatin (PRAVACHOL) 20 MG tablet Take 1 tablet by mouth Daily. 90 tablet 3    levothyroxine (SYNTHROID, LEVOTHROID) 50 MCG tablet Take 1 tablet by mouth Daily. (Patient not taking: Reported on 5/27/2025) 90 tablet 2    predniSONE (DELTASONE) 10 MG tablet Take 1 tablet by mouth 2 (Two) Times a Day. (Patient not taking: Reported on 5/27/2025) 14 tablet 0     No facility-administered medications prior to visit.     No opioid medication identified on active medication list. I have reviewed chart for other potential  high risk medication/s and harmful drug interactions in the elderly.      Aspirin is on active medication list. Aspirin use is indicated based on review of current medical  "condition/s. Pros and cons of this therapy have been discussed today. Benefits of this medication outweigh potential harm.  Patient has been encouraged to continue taking this medication.  .      Patient Active Problem List   Diagnosis    Chronic pain of right knee    Gastroesophageal reflux disease without esophagitis    Breast pain, left    Lower urinary tract symptoms (LUTS)    Urinary tract infectious disease    Healthcare maintenance    Squamous cell skin cancer    Hypothyroidism    Hyperlipidemia    Hypertensive disorder    COVID-19    On statin therapy    Stage 3a chronic kidney disease    Elevated hemoglobin A1c     Advance Care Planning Advance Directive is not on file.  ACP discussion was held with the patient during this visit. Patient does not have an advance directive, declines further assistance.            Objective   Vitals:    05/27/25 1533   BP: 112/66   BP Location: Left arm   Patient Position: Sitting   Cuff Size: Adult   Pulse: 56   Temp: 98 °F (36.7 °C)   SpO2: 96%   Weight: 68.8 kg (151 lb 9.6 oz)   Height: 162.6 cm (64\")   PainSc: 0-No pain       Estimated body mass index is 26.02 kg/m² as calculated from the following:    Height as of this encounter: 162.6 cm (64\").    Weight as of this encounter: 68.8 kg (151 lb 9.6 oz).    BMI is >= 25 and <30. (Overweight) The following options were offered after discussion;: exercise counseling/recommendations and nutrition counseling/recommendations    Gait and Balance Evaluation:  Normal         Does the patient have evidence of cognitive impairment? No                                                                                                Health  Risk Assessment    Smoking Status:  Social History     Tobacco Use   Smoking Status Never   Smokeless Tobacco Never     Alcohol Consumption:  Social History     Substance and Sexual Activity   Alcohol Use Never       Fall Risk Screen  STEADI Fall Risk Assessment was completed, and patient is at LOW " risk for falls.Assessment completed on:2025    Depression Screening   Little interest or pleasure in doing things? Not at all   Feeling down, depressed, or hopeless? Not at all   PHQ-2 Total Score 0      Health Habits and Functional and Cognitive Screenin/27/2025     3:32 PM   Functional & Cognitive Status   Do you have difficulty preparing food and eating? No   Do you have difficulty bathing yourself, getting dressed or grooming yourself? No   Do you have difficulty using the toilet? No   Do you have difficulty moving around from place to place? No   Do you have trouble with steps or getting out of a bed or a chair? No   Current Diet Well Balanced Diet   Dental Exam Up to date   Eye Exam Up to date   Exercise (times per week) 0 times per week   Current Exercises Include Walking   Do you need help using the phone?  No   Are you deaf or do you have serious difficulty hearing?  No   Do you need help to go to places out of walking distance? No   Do you need help shopping? No   Do you need help preparing meals?  No   Do you need help with housework?  No   Do you need help with laundry? No   Do you need help taking your medications? No   Do you need help managing money? No   Do you ever drive or ride in a car without wearing a seat belt? No   Have you felt unusual stress, anger or loneliness in the last month? No   Who do you live with? Spouse   If you need help, do you have trouble finding someone available to you? No   Have you been bothered in the last four weeks by sexual problems? No   Do you have difficulty concentrating, remembering or making decisions? No           Age-appropriate Screening Schedule:  Refer to the list below for future screening recommendations based on patient's age, sex and/or medical conditions. Orders for these recommended tests are listed in the plan section. The patient has been provided with a written plan.    Health Maintenance List  Health Maintenance   Topic Date Due     LIPID PANEL  03/12/2025    DXA SCAN  05/27/2025 (Originally 7/14/2023)    ANNUAL WELLNESS VISIT  05/27/2026    Pneumococcal Vaccine 50+  Completed    COVID-19 Vaccine  Discontinued    RSV Vaccine - Adults  Discontinued    INFLUENZA VACCINE  Discontinued    MAMMOGRAM  Discontinued    TDAP/TD VACCINES  Discontinued    ZOSTER VACCINE  Discontinued                                                                                                                                                CMS Preventative Services Quick Reference  Risk Factors Identified During Encounter  Dental Screening Recommended  Vision Screening Recommended    The above risks/problems have been discussed with the patient.  Pertinent information has been shared with the patient in the After Visit Summary.  An After Visit Summary and PPPS were made available to the patient.    Follow Up:   Next Medicare Wellness visit to be scheduled in 1 year.         Additional E&M Note during same encounter follows:  Patient has additional, significant, and separately identifiable condition(s)/problem(s) that require work above and beyond the Medicare Wellness Visit     Chief Complaint  Medicare Wellness-subsequent    Subjective   HPI  Patient presents today for medicare wellness exam and follow up on chronic conditions    Patient has declined vaccines at previous visit    Hypertension: Patient is currently on amlodipine 5 mg and atenolol 50 mg.  No chest pain, palpitations, headaches    Hyperlipidemia: currently on pravastatin 20 mg and a daily baby aspirin    Previous labs have noted elevated A1c and altered kidney function    Hypothyroidism: Patient is currently on Synthroid.  She does need to stay on namebrand due to reactions to off brand medications.    She is a non-smoker.  No excessive alcohol intake or drug use    Patient's last mammogram was in 2022.  She does have a history of right mastectomy in 1975 for breast cancer in her 30s.  She declined wanting  "any more mammograms.  Patient declined DEXA scan and colonoscopy in the past    Patient again respectfully declined immunizations, mammogram, colonoscopy, DEXA scan    Dental and vision screenings are up-to-date    Patient would appreciate her amlodipine, aspirin, atenolol, pravastatin to [facility] pharmacy.  Levothyroxine/Synthroid will go to [facility]    Patient does have noted history of chronic knee pain.  Per chart review she is seeing Ortho with injections in the joint.  Her knees have recently been bothering her more.  She is concerned about possible arthritis.  She is conscious about use of NSAIDs due to kidney function    Patient is considered low fall risk                Objective   Vital Signs:  /66 (BP Location: Left arm, Patient Position: Sitting, Cuff Size: Adult)   Pulse 56   Temp 98 °F (36.7 °C)   Ht 162.6 cm (64\")   Wt 68.8 kg (151 lb 9.6 oz)   SpO2 96%   BMI 26.02 kg/m²   Physical Exam  Vitals and nursing note reviewed.   Constitutional:       General: She is awake.      Appearance: Normal appearance. She is well-groomed.   HENT:      Head: Normocephalic and atraumatic.   Eyes:      Extraocular Movements: Extraocular movements intact.      Pupils: Pupils are equal, round, and reactive to light.      Comments: Glasses in place   Cardiovascular:      Rate and Rhythm: Normal rate and regular rhythm.      Heart sounds: Normal heart sounds.   Pulmonary:      Effort: Pulmonary effort is normal.      Breath sounds: Normal breath sounds.   Musculoskeletal:         General: Normal range of motion.   Skin:     General: Skin is warm and dry.   Neurological:      Mental Status: She is alert and oriented to person, place, and time.      Comments: Mental status fully intact as patient was able to provide a detailed description of the events  Gait and balance intact during today visits    Psychiatric:         Mood and Affect: Mood normal.         Behavior: Behavior normal. Behavior is cooperative. "              Assessment and Plan Additional age appropriate preventative wellness advice topics were discussed during today's preventative wellness exam(some topics already addressed during AWV portion of the note above):   Nutrition: Discussed nutrition plan with patient. Information shared in after visit summary. Goal is for a well balanced diet to enhance overall health.     Healthy Weight: Discussed current and goal BMI with patient. Steps to attain this goal discussed. Information shared in after visit summary.        Encounter for subsequent annual wellness visit (AWV) in Medicare patient    Primary hypertension    Acquired hypothyroidism    Mixed hyperlipidemia     On statin therapy    Long-term use of aspirin therapy    Elevated hemoglobin A1c    Stage 3a chronic kidney disease    Non-smoker    Immunization declined    Mammogram declined    Colon cancer screening declined    Status post right mastectomy    History of breast cancer    At low risk for fall    Depression screen    Wears glasses    BMI 26.0-26.9,adult    Chronic pain of both knees      Plan  Medicare wellness visit and follow-up on chronic conditions with acute concern addressed today    Updated refills of medication sent to the pharmacy    Labs are ordered and will be obtained when patient is been fasting.  She is aware I will be out of the office Friday and Monday    Immunizations, mammogram, DEXA scan, colon cancer screening declined today per patient    Depression screen is negative results    Continue with updated dental and vision screenings    Regarding her knee pain, we did discuss interventions.  X-ray was ordered.  Patient will be notified of results as they return.  I did provide her the phone number for consideration of follow-up with orthopedics    Go to ER if any condition worsens or severe    Plan follow-up 1 year for wellness exam    Orders Placed This Encounter   Procedures    XR Knee 3 View Bilateral     Standing Status:    Future     Expected Date:   5/30/2025     Expiration Date:   8/27/2026     Reason for Exam::   chronic knee pain     Release to patient:   Routine Release [[id number]]    CBC (No Diff)     Standing Status:   Future     Expected Date:   6/10/2025     Expiration Date:   5/27/2026     Release to patient:   Routine Release [[id number]]    Comprehensive Metabolic Panel     Standing Status:   Future     Expected Date:   6/10/2025     Expiration Date:   5/27/2026     Release to patient:   Routine Release [[id number]]    Lipid Panel     Standing Status:   Future     Expected Date:   6/10/2025     Expiration Date:   5/27/2026     Release to patient:   Routine Release [[id number]]    Hemoglobin A1c     Standing Status:   Future     Expected Date:   6/10/2025     Expiration Date:   5/27/2026     Release to patient:   Routine Release [[id number]]    Microalbumin / Creatinine Urine Ratio - Urine, Clean Catch     Standing Status:   Future     Expected Date:   6/10/2025     Expiration Date:   5/27/2026     Release to patient:   Routine Release [[id number]]    TSH Rfx On Abnormal To Free T4     Standing Status:   Future     Expected Date:   6/10/2025     Expiration Date:   5/27/2026     Release to patient:   Routine Release [[id number]]     New Medications Ordered This Visit   Medications    amLODIPine (NORVASC) 5 MG tablet     Sig: Take 1 tablet by mouth Daily.     Dispense:  90 tablet     Refill:  3    aspirin 81 MG EC tablet     Sig: Take 1 tablet by mouth Daily.     Dispense:  90 tablet     Refill:  3    atenolol (TENORMIN) 50 MG tablet     Sig: Take 1 tablet by mouth Daily.     Dispense:  90 tablet     Refill:  3    pravastatin (PRAVACHOL) 20 MG tablet     Sig: Take 1 tablet by mouth Daily.     Dispense:  90 tablet     Refill:  3    ibuprofen (Motrin IB) 200 MG tablet     Sig: Take 1 tablet by mouth Every 6 (Six) Hours As Needed for Mild Pain.     Dispense:  30 tablet     Refill:  2    levothyroxine (SYNTHROID, LEVOTHROID) 50  MCG tablet     Sig: Take 1 tablet by mouth Daily.     Dispense:  90 tablet     Refill:  3     BRENDAN-0        I spent 45 minutes caring for Christina on this date of service. This time includes time spent by me in the following activities:preparing for the visit, reviewing tests, obtaining and/or reviewing a separately obtained history, performing a medically appropriate examination and/or evaluation , counseling and educating the patient/family/caregiver, ordering medications, tests, or procedures, referring and communicating with other health care professionals , and documenting information in the medical record  Follow Up   Return for Medicare Wellness.  Patient was given instructions and counseling regarding her condition or for health maintenance advice. Please see specific information pulled into the AVS if appropriate.

## 2025-06-05 ENCOUNTER — LAB (OUTPATIENT)
Dept: LAB | Facility: HOSPITAL | Age: 80
End: 2025-06-05
Payer: MEDICARE

## 2025-06-05 DIAGNOSIS — R73.09 ELEVATED HEMOGLOBIN A1C: ICD-10-CM

## 2025-06-05 DIAGNOSIS — Z00.00 ENCOUNTER FOR SUBSEQUENT ANNUAL WELLNESS VISIT (AWV) IN MEDICARE PATIENT: ICD-10-CM

## 2025-06-05 DIAGNOSIS — I10 PRIMARY HYPERTENSION: ICD-10-CM

## 2025-06-05 DIAGNOSIS — E03.9 ACQUIRED HYPOTHYROIDISM: ICD-10-CM

## 2025-06-05 DIAGNOSIS — E78.2 MIXED HYPERLIPIDEMIA: ICD-10-CM

## 2025-06-05 LAB
ALBUMIN SERPL-MCNC: 3.9 G/DL (ref 3.5–5.2)
ALBUMIN UR-MCNC: <1.2 MG/DL
ALBUMIN/GLOB SERPL: 1.5 G/DL
ALP SERPL-CCNC: 83 U/L (ref 39–117)
ALT SERPL W P-5'-P-CCNC: 8 U/L (ref 1–33)
ANION GAP SERPL CALCULATED.3IONS-SCNC: 8.4 MMOL/L (ref 5–15)
AST SERPL-CCNC: 18 U/L (ref 1–32)
BILIRUB SERPL-MCNC: 0.3 MG/DL (ref 0–1.2)
BUN SERPL-MCNC: 21 MG/DL (ref 8–23)
BUN/CREAT SERPL: 18.6 (ref 7–25)
CALCIUM SPEC-SCNC: 9.1 MG/DL (ref 8.6–10.5)
CHLORIDE SERPL-SCNC: 108 MMOL/L (ref 98–107)
CHOLEST SERPL-MCNC: 159 MG/DL (ref 0–200)
CO2 SERPL-SCNC: 24.6 MMOL/L (ref 22–29)
CREAT SERPL-MCNC: 1.13 MG/DL (ref 0.57–1)
CREAT UR-MCNC: 126.5 MG/DL
DEPRECATED RDW RBC AUTO: 44.1 FL (ref 37–54)
EGFRCR SERPLBLD CKD-EPI 2021: 49 ML/MIN/1.73
ERYTHROCYTE [DISTWIDTH] IN BLOOD BY AUTOMATED COUNT: 13 % (ref 12.3–15.4)
GLOBULIN UR ELPH-MCNC: 2.6 GM/DL
GLUCOSE SERPL-MCNC: 91 MG/DL (ref 65–99)
HBA1C MFR BLD: 5.6 % (ref 4.8–5.6)
HCT VFR BLD AUTO: 34.9 % (ref 34–46.6)
HDLC SERPL-MCNC: 57 MG/DL (ref 40–60)
HGB BLD-MCNC: 11.4 G/DL (ref 12–15.9)
LDLC SERPL CALC-MCNC: 90 MG/DL (ref 0–100)
LDLC/HDLC SERPL: 1.59 {RATIO}
MCH RBC QN AUTO: 30.3 PG (ref 26.6–33)
MCHC RBC AUTO-ENTMCNC: 32.7 G/DL (ref 31.5–35.7)
MCV RBC AUTO: 92.8 FL (ref 79–97)
MICROALBUMIN/CREAT UR: NORMAL MG/G{CREAT}
PLATELET # BLD AUTO: 229 10*3/MM3 (ref 140–450)
PMV BLD AUTO: 11.9 FL (ref 6–12)
POTASSIUM SERPL-SCNC: 4.7 MMOL/L (ref 3.5–5.2)
PROT SERPL-MCNC: 6.5 G/DL (ref 6–8.5)
RBC # BLD AUTO: 3.76 10*6/MM3 (ref 3.77–5.28)
SODIUM SERPL-SCNC: 141 MMOL/L (ref 136–145)
TRIGL SERPL-MCNC: 58 MG/DL (ref 0–150)
TSH SERPL DL<=0.05 MIU/L-ACNC: 4.1 UIU/ML (ref 0.27–4.2)
VLDLC SERPL-MCNC: 12 MG/DL (ref 5–40)
WBC NRBC COR # BLD AUTO: 7.59 10*3/MM3 (ref 3.4–10.8)

## 2025-06-05 PROCEDURE — 83036 HEMOGLOBIN GLYCOSYLATED A1C: CPT

## 2025-06-05 PROCEDURE — 82570 ASSAY OF URINE CREATININE: CPT

## 2025-06-05 PROCEDURE — 84443 ASSAY THYROID STIM HORMONE: CPT

## 2025-06-05 PROCEDURE — 80053 COMPREHEN METABOLIC PANEL: CPT

## 2025-06-05 PROCEDURE — 80061 LIPID PANEL: CPT

## 2025-06-05 PROCEDURE — 85027 COMPLETE CBC AUTOMATED: CPT

## 2025-06-05 PROCEDURE — 82043 UR ALBUMIN QUANTITATIVE: CPT

## 2025-06-06 ENCOUNTER — RESULTS FOLLOW-UP (OUTPATIENT)
Dept: INTERNAL MEDICINE | Facility: CLINIC | Age: 80
End: 2025-06-06
Payer: MEDICARE

## 2025-06-06 DIAGNOSIS — N18.31 STAGE 3A CHRONIC KIDNEY DISEASE: Primary | ICD-10-CM

## 2025-06-06 DIAGNOSIS — D64.9 LOW HEMOGLOBIN: ICD-10-CM

## 2025-06-06 NOTE — TELEPHONE ENCOUNTER
Called and spoke to pt. Gave message from provider as to A1c, lipid panel, thyroid, hemoglobin and kidney function. Pt voiced understanding and appreciation.   Pt will proceed with additional lab work and kidney ultrasound.

## 2025-06-10 DIAGNOSIS — E78.2 MIXED HYPERLIPIDEMIA: ICD-10-CM

## 2025-06-10 DIAGNOSIS — I10 PRIMARY HYPERTENSION: ICD-10-CM

## 2025-06-10 RX ORDER — PRAVASTATIN SODIUM 20 MG
20 TABLET ORAL DAILY
Qty: 90 TABLET | Refills: 0 | OUTPATIENT
Start: 2025-06-10

## 2025-06-10 RX ORDER — ATENOLOL 50 MG/1
50 TABLET ORAL DAILY
Qty: 90 TABLET | Refills: 0 | OUTPATIENT
Start: 2025-06-10

## 2025-07-02 ENCOUNTER — HOSPITAL ENCOUNTER (OUTPATIENT)
Dept: ULTRASOUND IMAGING | Facility: HOSPITAL | Age: 80
Discharge: HOME | End: 2025-07-02
Admitting: NURSE PRACTITIONER
Payer: MEDICARE

## 2025-07-02 DIAGNOSIS — N18.31 STAGE 3A CHRONIC KIDNEY DISEASE: ICD-10-CM

## 2025-07-02 PROCEDURE — 76775 US EXAM ABDO BACK WALL LIM: CPT

## 2025-07-07 ENCOUNTER — RESULTS FOLLOW-UP (OUTPATIENT)
Dept: INTERNAL MEDICINE | Facility: CLINIC | Age: 80
End: 2025-07-07
Payer: MEDICARE

## 2025-07-07 NOTE — LETTER
July 9, 2025     Christina TASHI Mindy  [id number]  [facility] [address]      Dear Ms. Guillen:    Below are the results from your recent visit:    Resulted Orders   US Renal Bilateral    Narrative    US RENAL BILATERAL    Date of Exam: 7/2/2025 1:49 PM EDT    Indication: chronic kidney disease noted on multiple lab work.    Comparison: CT 5/8/2022    Technique: Grayscale and color Doppler ultrasound evaluation of the kidneys and urinary bladder was performed.        Findings:  The right kidney measures 9.0 x 3.2 x 3.9 cm in length and the left kidney measures 8.2 x 4.0 x 4.3 cm in length    Kidneys demonstrate normal cortical echogenicity.  No hydronephrosis or intrarenal stones.  No focal lesions.    Bladder is unremarkable in appearance.         Impression    Unremarkable ultrasound of the kidneys.  Unremarkable ultrasound of the bladder.    Electronically Signed: Adam Colorado MD    7/3/2025 5:11 PM EDT    Workstation ID: [id number]         Ultrasound resulted. Overall kidneys and bladder are within normal limits.     If you have any questions or concerns, please don't hesitate to call.         Sincerely,        Sunita Mora, APRN